# Patient Record
Sex: FEMALE | Race: BLACK OR AFRICAN AMERICAN | Employment: OTHER | URBAN - METROPOLITAN AREA
[De-identification: names, ages, dates, MRNs, and addresses within clinical notes are randomized per-mention and may not be internally consistent; named-entity substitution may affect disease eponyms.]

---

## 2017-05-26 ENCOUNTER — OP HISTORICAL/CONVERTED ENCOUNTER (OUTPATIENT)
Dept: OTHER | Age: 47
End: 2017-05-26

## 2017-10-12 ENCOUNTER — DOCUMENTATION ONLY (OUTPATIENT)
Dept: SURGERY | Age: 47
End: 2017-10-12

## 2017-10-13 ENCOUNTER — DOCUMENTATION ONLY (OUTPATIENT)
Dept: SURGERY | Age: 47
End: 2017-10-13

## 2017-10-13 ENCOUNTER — OFFICE VISIT (OUTPATIENT)
Dept: SURGERY | Age: 47
End: 2017-10-13

## 2017-10-13 VITALS
SYSTOLIC BLOOD PRESSURE: 156 MMHG | BODY MASS INDEX: 34.07 KG/M2 | HEIGHT: 69 IN | DIASTOLIC BLOOD PRESSURE: 100 MMHG | WEIGHT: 230 LBS | HEART RATE: 62 BPM

## 2017-10-13 DIAGNOSIS — N64.52 DISCHARGE FROM LEFT NIPPLE: Primary | ICD-10-CM

## 2017-10-13 NOTE — LETTER
10/13/2017 2:28 PM 
 
Patient:  Breann Oscar YOB: 1970 Date of Visit: 10/13/2017 Dear Dr. Renae Stapleton: 
 
Thank you for referring Ms. Breann Oscar to me for evaluation/treatment. Below are the relevant portions of my assessment and plan of care. HISTORY OF PRESENT ILLNESS Breann Oscar is a 42 y.o. female. HPI 
NEW patient consult referred by Dr. Renae Stapleton for LEFT nipple discharge. About a year ago, her LEFT breast was itchy. She squeezed her nipple and a clear brown discharge came out. The discharge comes out only when she squeezes her nipple. The discharge can sometimes be white. Has lumpy breasts. No nipple retraction. Denies pain. Breast reduction 2001. Reports normal menstrual periods. 
 
  
Denies FH of breast or ovarian cancer.  
  
Mammogram, [address] [facility] [address], 5/2017, BIRADS 1 History reviewed. No pertinent past medical history. Past Surgical History:  
Procedure Laterality Date  HX BREAST REDUCTION  2001  HX HEMORRHOIDECTOMY  2014 Social History Social History  Marital status:  Spouse name: N/A  
 Number of children: N/A  
 Years of education: N/A Occupational History  Not on file. Social History Main Topics  Smoking status: Never Smoker  Smokeless tobacco: Never Used  Alcohol use Yes Comment: 1 drink per week  Drug use: Not on file  Sexual activity: Not on file Other Topics Concern  Not on file Social History Narrative  No narrative on file No current outpatient prescriptions on file prior to visit. No current facility-administered medications on file prior to visit. Allergies Allergen Reactions  Sulfa (Sulfonamide Antibiotics) Other (comments) ROS Constitutional: Negative HENT: Negative. Eyes: Negative. Respiratory: Negative. Cardiovascular: Negative. Gastrointestinal: Negative. Genitourinary: Negative. Musculoskeletal: Negative. Skin: Negative. Neurological: Negative. Endo/Heme/Allergies: Negative. Psychiatric/Behavioral: Positive for insomnia Physical Exam  
Cardiovascular: Normal rate and normal heart sounds. Pulmonary/Chest: Breath sounds normal. Right breast exhibits no inverted nipple, no mass, no nipple discharge, no skin change and no tenderness. Left breast exhibits no inverted nipple, no mass, no nipple discharge, no skin change and no tenderness. Breasts are symmetrical.  
Lymphadenopathy:  
     Right cervical: No superficial cervical, no deep cervical and no posterior cervical adenopathy present. Left cervical: No superficial cervical, no deep cervical and no posterior cervical adenopathy present. Right axillary: No pectoral and no lateral adenopathy present. Left axillary: No pectoral and no lateral adenopathy present. ASSESSMENT and PLAN 
  ICD-10-CM ICD-9-CM 1. Discharge from left nipple N64.52 611.79 Patient is presenting due to LEFT clear nipple discharge and pain. Was able to express clear discharge during physical exam today. Recommended ductal excision. Discussed procedure, risks, and benefits of ductal excision. Also discussed recovery period of 1 week with some increased sensitivity during that week. This plan was reviewed with the patient and patient agrees. All questions were answered. Will schedule outpatient ductal excision. Written by Allison Dill, as dictated by Dr. Ariane Powers MD.  
 
 
 
If you have questions, please do not hesitate to call me. I look forward to following Ms. Ning Delaney along with you.  
 
 
 
Sincerely, 
 
 
Idalia Cosby MD

## 2017-10-13 NOTE — PATIENT INSTRUCTIONS
Nipple Discharge: Care Instructions  Your Care Instructions  Fluid leaking from one or both nipples when you are not breastfeeding is called nipple discharge. Clear, cloudy, or white discharge that appears only when you press on your nipple is usually normal. The more the nipple is pressed or stimulated, the more fluid appears. Yellow, green, or brown discharge is not normal and may be a symptom of an infection or other problem. Spontaneous discharge appears without pressing or stimulating the nipple. This is not normal unless you are pregnant or breastfeeding. It may be a side effect of a medicine, or it may be caused by other health problems. The treatment of spontaneous nipple discharge depends on what is causing it. You may need additional tests to find out what is causing the nipple discharge. Follow-up care is a key part of your treatment and safety. Be sure to make and go to all appointments, and call your doctor if you are having problems. Its also a good idea to know your test results and keep a list of the medicines you take. How can you care for yourself at home? · If your doctor gave you medicine, take it exactly as prescribed. Call your doctor if you think you are having a problem with your medicine. · Wear a supportive bra, such as a sports bra or jog bra. · Avoid stimulating your breast until you have your follow-up appointment. When should you call for help? Call your doctor now or seek immediate medical care if:  · You have symptoms of a breast infection, such as:  ¨ Increased pain, swelling, redness, or warmth around a breast.  ¨ Red streaks extending from the breast.  ¨ Pus draining from a breast.  ¨ A fever. Watch closely for changes in your health, and be sure to contact your doctor if:  · You notice any changes in your breast or discharge. · You do not get better as expected. Where can you learn more? Go to http://katiana-stella.info/.   Enter K471 in the search box to learn more about \"Nipple Discharge: Care Instructions. \"  Current as of: March 20, 2017  Content Version: 11.3  © 5544-6840 [employer]. Care instructions adapted under license by [employer] (which disclaims liability or warranty for this information). If you have questions about a medical condition or this instruction, always ask your healthcare professional. [place] [address] any warranty or liability for your use of this information.

## 2017-10-13 NOTE — MR AVS SNAPSHOT
Visit Information Date & Time Provider Department Dept. Phone Encounter #  
 10/13/2017  7:05 AM John Mcgregor MD [address] [facility] at [address] [place] [address] [id number] Upcoming Health Maintenance Date Due DTaP/Tdap/Td series (1 - Tdap) 12/1/1993 PAP AKA CERVICAL CYTOLOGY 12/1/1993 INFLUENZA AGE 9 TO ADULT 8/1/2017 Allergies as of 10/13/2017  Review Complete On: 10/13/2017 By: Vern Hatch RN Severity Noted Reaction Type Reactions Sulfa (Sulfonamide Antibiotics) High 10/13/2017    Swelling, Angioedema Current Immunizations  Never Reviewed No immunizations on file. Not reviewed this visit You Were Diagnosed With   
  
 Codes Comments Discharge from left nipple    -  Primary ICD-10-CM: N64.52 
ICD-9-CM: 611.79 Vitals BP Pulse Height(growth percentile) Weight(growth percentile) LMP BMI  
 (!) 156/100 62 5' 9\" (1.753 m) 230 lb (104.3 kg) 10/01/2017 33.97 kg/m2 OB Status Smoking Status Having regular periods Never Smoker Vitals History BMI and BSA Data Body Mass Index Body Surface Area  
 33.97 kg/m 2 2.25 m 2 Your Updated Medication List  
  
Notice  As of 10/13/2017 10:23 AM  
 You have not been prescribed any medications. Patient Instructions Nipple Discharge: Care Instructions Your Care Instructions Fluid leaking from one or both nipples when you are not breastfeeding is called nipple discharge. Clear, cloudy, or white discharge that appears only when you press on your nipple is usually normal. The more the nipple is pressed or stimulated, the more fluid appears. Yellow, green, or brown discharge is not normal and may be a symptom of an infection or other problem. Spontaneous discharge appears without pressing or stimulating the nipple. This is not normal unless you are pregnant or breastfeeding.  It may be a side effect of a medicine, or it may be caused by other health problems. The treatment of spontaneous nipple discharge depends on what is causing it. You may need additional tests to find out what is causing the nipple discharge. Follow-up care is a key part of your treatment and safety. Be sure to make and go to all appointments, and call your doctor if you are having problems. Its also a good idea to know your test results and keep a list of the medicines you take. How can you care for yourself at home? · If your doctor gave you medicine, take it exactly as prescribed. Call your doctor if you think you are having a problem with your medicine. · Wear a supportive bra, such as a sports bra or jog bra. · Avoid stimulating your breast until you have your follow-up appointment. When should you call for help? Call your doctor now or seek immediate medical care if: 
· You have symptoms of a breast infection, such as: 
¨ Increased pain, swelling, redness, or warmth around a breast. 
¨ Red streaks extending from the breast. 
¨ Pus draining from a breast. 
¨ A fever. Watch closely for changes in your health, and be sure to contact your doctor if: 
· You notice any changes in your breast or discharge. · You do not get better as expected. Where can you learn more? Go to http://katiana-stella.info/. Enter C834 in the search box to learn more about \"Nipple Discharge: Care Instructions. \" Current as of: March 20, 2017 Content Version: 11.3 © 5336-7672 [employer]. Care instructions adapted under license by [employer] (which disclaims liability or warranty for this information). If you have questions about a medical condition or this instruction, always ask your healthcare professional. [place] [address] any warranty or liability for your use of this information. Introducing [facility]! [facility] [facility] introduces Reonomy patient portal. Now you can access parts of your medical record, email your doctor's office, and request medication refills online. 1. In your internet browser, go to [email or web address] 2. Click on the First Time User? Click Here link in the Sign In box. You will see the New Member Sign Up page. 3. Enter your Reonomy Access Code exactly as it appears below. You will not need to use this code after youve completed the sign-up process. If you do not sign up before the expiration date, you must request a new code. · Reonomy Access Code: [email or web address] Expires: 1/11/2018  9:13 AM 
 
4. Enter the last four digits of your Social Security Number (xxxx) and Date of Birth (mm/dd/yyyy) as indicated and click Submit. You will be taken to the next sign-up page. 5. Create a Reonomy ID. This will be your Reonomy login ID and cannot be changed, so think of one that is secure and easy to remember. 6. Create a Reonomy password. You can change your password at any time. 7. Enter your Password Reset Question and Answer. This can be used at a later time if you forget your password. 8. Enter your e-mail address. You will receive e-mail notification when new information is available in [address] 9. Click Sign Up. You can now view and download portions of your medical record. 10. Click the Download Summary menu link to download a portable copy of your medical information. If you have questions, please visit the Frequently Asked Questions section of the Reonomy website. Remember, Reonomy is NOT to be used for urgent needs. For medical emergencies, dial 911. Now available from your iPhone and Android! Please provide this summary of care documentation to your next provider. Your primary care clinician is listed as Nahun Alexander. If you have any questions after today's visit, please call [phone].

## 2017-10-13 NOTE — PROGRESS NOTES
HISTORY OF PRESENT ILLNESS  Pema Hudson is a 42 y.o. female. HPI  NEW patient consult referred by Dr. Ovidio Olivera for LEFT nipple discharge. About a year ago, her LEFT breast was itchy. She squeezed her nipple and a clear brown discharge came out. The discharge comes out only when she squeezes her nipple. The discharge can sometimes be white. Has lumpy breasts. No nipple retraction. Denies pain. Breast reduction 2001. Reports normal menstrual periods.       Denies FH of breast or ovarian cancer.      Mammogram, [address] [facility] [address], 5/2017, BIRADS 1     History reviewed. No pertinent past medical history. Past Surgical History:   Procedure Laterality Date    HX BREAST REDUCTION  2001    HX HEMORRHOIDECTOMY  2014       Social History     Social History    Marital status:      Spouse name: N/A    Number of children: N/A    Years of education: N/A     Occupational History    Not on file. Social History Main Topics    Smoking status: Never Smoker    Smokeless tobacco: Never Used    Alcohol use Yes      Comment: 1 drink per week    Drug use: Not on file    Sexual activity: Not on file     Other Topics Concern    Not on file     Social History Narrative    No narrative on file       No current outpatient prescriptions on file prior to visit. No current facility-administered medications on file prior to visit. Allergies   Allergen Reactions    Sulfa (Sulfonamide Antibiotics) Other (comments)           ROS  Constitutional: Negative    HENT: Negative. Eyes: Negative. Respiratory: Negative. Cardiovascular: Negative. Gastrointestinal: Negative. Genitourinary: Negative. Musculoskeletal: Negative. Skin: Negative. Neurological: Negative. Endo/Heme/Allergies: Negative. Psychiatric/Behavioral: Positive for insomnia    Physical Exam   Cardiovascular: Normal rate and normal heart sounds.     Pulmonary/Chest: Breath sounds normal. Right breast exhibits no inverted nipple, no mass, no nipple discharge, no skin change and no tenderness. Left breast exhibits no inverted nipple, no mass, no nipple discharge, no skin change and no tenderness. Breasts are symmetrical.   Lymphadenopathy:        Right cervical: No superficial cervical, no deep cervical and no posterior cervical adenopathy present. Left cervical: No superficial cervical, no deep cervical and no posterior cervical adenopathy present. Right axillary: No pectoral and no lateral adenopathy present. Left axillary: No pectoral and no lateral adenopathy present. ASSESSMENT and PLAN    ICD-10-CM ICD-9-CM    1. Discharge from left nipple N64.52 611.79         Patient is presenting due to LEFT clear nipple discharge and pain. Was able to express clear discharge during physical exam today. Recommended ductal excision. Discussed procedure, risks, and benefits of ductal excision. Also discussed recovery period of 1 week with some increased sensitivity during that week. This plan was reviewed with the patient and patient agrees. All questions were answered. Will schedule outpatient ductal excision.     Written by Lanette Healy, as dictated by Dr. Yanni Gibson MD.

## 2017-10-13 NOTE — PROGRESS NOTES
Type of Film: [x] CD [] FILMS  Type of Test: [] MRI [x] MAMMO  From: [facility]  Given to: script drawer at [place] [address]  To be Downloaded into PACS:  NO

## 2017-10-13 NOTE — COMMUNICATION BODY
HISTORY OF PRESENT ILLNESS  Moiz Williamson is a 42 y.o. female. HPI  NEW patient consult referred by Dr. Colt Perez for LEFT nipple discharge. About a year ago, her LEFT breast was itchy. She squeezed her nipple and a clear brown discharge came out. The discharge comes out only when she squeezes her nipple. The discharge can sometimes be white. Has lumpy breasts. No nipple retraction. Denies pain. Breast reduction 2001. Reports normal menstrual periods.       Denies FH of breast or ovarian cancer.      Mammogram, [address] [facility] [address], 5/2017, BIRADS 1     History reviewed. No pertinent past medical history. Past Surgical History:   Procedure Laterality Date    HX BREAST REDUCTION  2001    HX HEMORRHOIDECTOMY  2014       Social History     Social History    Marital status:      Spouse name: N/A    Number of children: N/A    Years of education: N/A     Occupational History    Not on file. Social History Main Topics    Smoking status: Never Smoker    Smokeless tobacco: Never Used    Alcohol use Yes      Comment: 1 drink per week    Drug use: Not on file    Sexual activity: Not on file     Other Topics Concern    Not on file     Social History Narrative    No narrative on file       No current outpatient prescriptions on file prior to visit. No current facility-administered medications on file prior to visit. Allergies   Allergen Reactions    Sulfa (Sulfonamide Antibiotics) Other (comments)           ROS  Constitutional: Negative    HENT: Negative. Eyes: Negative. Respiratory: Negative. Cardiovascular: Negative. Gastrointestinal: Negative. Genitourinary: Negative. Musculoskeletal: Negative. Skin: Negative. Neurological: Negative. Endo/Heme/Allergies: Negative. Psychiatric/Behavioral: Positive for insomnia    Physical Exam   Cardiovascular: Normal rate and normal heart sounds.     Pulmonary/Chest: Breath sounds normal. Right breast exhibits no inverted nipple, no mass, no nipple discharge, no skin change and no tenderness. Left breast exhibits no inverted nipple, no mass, no nipple discharge, no skin change and no tenderness. Breasts are symmetrical.   Lymphadenopathy:        Right cervical: No superficial cervical, no deep cervical and no posterior cervical adenopathy present. Left cervical: No superficial cervical, no deep cervical and no posterior cervical adenopathy present. Right axillary: No pectoral and no lateral adenopathy present. Left axillary: No pectoral and no lateral adenopathy present. ASSESSMENT and PLAN    ICD-10-CM ICD-9-CM    1. Discharge from left nipple N64.52 611.79         Patient is presenting due to LEFT clear nipple discharge and pain. Was able to express clear discharge during physical exam today. Recommended ductal excision. Discussed procedure, risks, and benefits of ductal excision. Also discussed recovery period of 1 week with some increased sensitivity during that week. This plan was reviewed with the patient and patient agrees. All questions were answered. Will schedule outpatient ductal excision.     Written by Ever Soares, as dictated by Dr. Matilda Merrill MD.

## 2017-10-13 NOTE — PROGRESS NOTES
HISTORY OF PRESENT ILLNESS  Reina Cam is a 42 y.o. female. HPI  NEW patient consult referred by Dr. Pablo Lewis for LEFT nipple discharge. About a year ago, her LEFT breast was itchy. She squeezed her nipple and a clear brown discharge came out. The discharge comes out only when she squeezes her nipple. The discharge can sometimes be white. Has lumpy breasts. No nipple retraction. Denies pain. Denies FH of breast or ovarian cancer. Mammogram, [address] [facility] [address], 5/2017, BIRADS 1    Review of Systems   Constitutional: Negative. HENT: Negative. Eyes: Negative. Respiratory: Negative. Cardiovascular: Negative. Gastrointestinal: Negative. Genitourinary: Negative. Musculoskeletal: Negative. Skin: Negative. Neurological: Negative. Endo/Heme/Allergies: Negative. Psychiatric/Behavioral: The patient has insomnia.         Physical Exam    ASSESSMENT and PLAN  {ASSESSMENT/PLAN:62550}

## 2017-10-31 DIAGNOSIS — N64.52 NIPPLE DISCHARGE: Primary | ICD-10-CM

## 2017-11-16 ENCOUNTER — HOSPITAL ENCOUNTER (OUTPATIENT)
Age: 47
Setting detail: OUTPATIENT SURGERY
Discharge: HOME | End: 2017-11-16
Attending: SURGERY | Admitting: SURGERY
Payer: COMMERCIAL

## 2017-11-16 ENCOUNTER — ANESTHESIA (OUTPATIENT)
Dept: MEDSURG UNIT | Age: 47
End: 2017-11-16
Payer: COMMERCIAL

## 2017-11-16 ENCOUNTER — ANESTHESIA EVENT (OUTPATIENT)
Dept: MEDSURG UNIT | Age: 47
End: 2017-11-16
Payer: COMMERCIAL

## 2017-11-16 VITALS
HEIGHT: 69 IN | WEIGHT: 230 LBS | TEMPERATURE: 97.5 F | RESPIRATION RATE: 14 BRPM | SYSTOLIC BLOOD PRESSURE: 130 MMHG | BODY MASS INDEX: 34.07 KG/M2 | HEART RATE: 68 BPM | OXYGEN SATURATION: 95 % | DIASTOLIC BLOOD PRESSURE: 82 MMHG

## 2017-11-16 DIAGNOSIS — N64.52 NIPPLE DISCHARGE: ICD-10-CM

## 2017-11-16 LAB — HCG UR QL: NEGATIVE

## 2017-11-16 PROCEDURE — 77030032490 HC SLV COMPR SCD KNE COVD -B: Performed by: SURGERY

## 2017-11-16 PROCEDURE — 77030031139 HC SUT VCRL2 J&J -A: Performed by: SURGERY

## 2017-11-16 PROCEDURE — 74011250636 HC RX REV CODE- 250/636

## 2017-11-16 PROCEDURE — 77030019908 HC STETH ESOPH SIMS -A: Performed by: ANESTHESIOLOGY

## 2017-11-16 PROCEDURE — 77030010507 HC ADH SKN DERMBND J&J -B: Performed by: SURGERY

## 2017-11-16 PROCEDURE — 74011250636 HC RX REV CODE- 250/636: Performed by: ANESTHESIOLOGY

## 2017-11-16 PROCEDURE — 76030000001 HC AMB SURG OR TIME 1 TO 1.5: Performed by: SURGERY

## 2017-11-16 PROCEDURE — 88307 TISSUE EXAM BY PATHOLOGIST: CPT | Performed by: SURGERY

## 2017-11-16 PROCEDURE — 77030011640 HC PAD GRND REM COVD -A: Performed by: SURGERY

## 2017-11-16 PROCEDURE — 74011000250 HC RX REV CODE- 250: Performed by: SURGERY

## 2017-11-16 PROCEDURE — 77030018836 HC SOL IRR NACL ICUM -A: Performed by: SURGERY

## 2017-11-16 PROCEDURE — 81025 URINE PREGNANCY TEST: CPT

## 2017-11-16 PROCEDURE — 74011000250 HC RX REV CODE- 250

## 2017-11-16 PROCEDURE — 77030010509 HC AIRWY LMA MSK TELE -A: Performed by: ANESTHESIOLOGY

## 2017-11-16 PROCEDURE — 77030002996 HC SUT SLK J&J -A: Performed by: SURGERY

## 2017-11-16 PROCEDURE — 77030002933 HC SUT MCRYL J&J -A: Performed by: SURGERY

## 2017-11-16 PROCEDURE — 77030020782 HC GWN BAIR PAWS FLX 3M -B

## 2017-11-16 PROCEDURE — 76210000050 HC AMBSU PH II REC 0.5 TO 1 HR: Performed by: SURGERY

## 2017-11-16 PROCEDURE — 76060000062 HC AMB SURG ANES 1 TO 1.5 HR: Performed by: SURGERY

## 2017-11-16 PROCEDURE — 76210000038 HC AMBSU PH I REC 2.5 TO 3 HR: Performed by: SURGERY

## 2017-11-16 RX ORDER — MIDAZOLAM HYDROCHLORIDE 1 MG/ML
0.5 INJECTION, SOLUTION INTRAMUSCULAR; INTRAVENOUS
Status: DISCONTINUED | OUTPATIENT
Start: 2017-11-16 | End: 2017-11-16 | Stop reason: HOSPADM

## 2017-11-16 RX ORDER — LIDOCAINE HYDROCHLORIDE 10 MG/ML
0.1 INJECTION, SOLUTION EPIDURAL; INFILTRATION; INTRACAUDAL; PERINEURAL AS NEEDED
Status: DISCONTINUED | OUTPATIENT
Start: 2017-11-16 | End: 2017-11-16 | Stop reason: HOSPADM

## 2017-11-16 RX ORDER — ONDANSETRON 2 MG/ML
4 INJECTION INTRAMUSCULAR; INTRAVENOUS AS NEEDED
Status: DISCONTINUED | OUTPATIENT
Start: 2017-11-16 | End: 2017-11-16 | Stop reason: HOSPADM

## 2017-11-16 RX ORDER — SODIUM CHLORIDE, SODIUM LACTATE, POTASSIUM CHLORIDE, CALCIUM CHLORIDE 600; 310; 30; 20 MG/100ML; MG/100ML; MG/100ML; MG/100ML
INJECTION, SOLUTION INTRAVENOUS
Status: DISCONTINUED | OUTPATIENT
Start: 2017-11-16 | End: 2017-11-16

## 2017-11-16 RX ORDER — LIDOCAINE HYDROCHLORIDE 20 MG/ML
INJECTION, SOLUTION EPIDURAL; INFILTRATION; INTRACAUDAL; PERINEURAL AS NEEDED
Status: DISCONTINUED | OUTPATIENT
Start: 2017-11-16 | End: 2017-11-16 | Stop reason: HOSPADM

## 2017-11-16 RX ORDER — ONDANSETRON 2 MG/ML
INJECTION INTRAMUSCULAR; INTRAVENOUS AS NEEDED
Status: DISCONTINUED | OUTPATIENT
Start: 2017-11-16 | End: 2017-11-16 | Stop reason: HOSPADM

## 2017-11-16 RX ORDER — SODIUM CHLORIDE 0.9 % (FLUSH) 0.9 %
5-10 SYRINGE (ML) INJECTION EVERY 8 HOURS
Status: DISCONTINUED | OUTPATIENT
Start: 2017-11-16 | End: 2017-11-16 | Stop reason: HOSPADM

## 2017-11-16 RX ORDER — SODIUM CHLORIDE 9 MG/ML
1000 INJECTION, SOLUTION INTRAVENOUS CONTINUOUS
Status: DISCONTINUED | OUTPATIENT
Start: 2017-11-16 | End: 2017-11-16 | Stop reason: HOSPADM

## 2017-11-16 RX ORDER — MIDAZOLAM HYDROCHLORIDE 1 MG/ML
INJECTION, SOLUTION INTRAMUSCULAR; INTRAVENOUS AS NEEDED
Status: DISCONTINUED | OUTPATIENT
Start: 2017-11-16 | End: 2017-11-16 | Stop reason: HOSPADM

## 2017-11-16 RX ORDER — FENTANYL CITRATE 50 UG/ML
INJECTION, SOLUTION INTRAMUSCULAR; INTRAVENOUS AS NEEDED
Status: DISCONTINUED | OUTPATIENT
Start: 2017-11-16 | End: 2017-11-16 | Stop reason: HOSPADM

## 2017-11-16 RX ORDER — MIDAZOLAM HYDROCHLORIDE 1 MG/ML
1 INJECTION, SOLUTION INTRAMUSCULAR; INTRAVENOUS AS NEEDED
Status: DISCONTINUED | OUTPATIENT
Start: 2017-11-16 | End: 2017-11-16 | Stop reason: HOSPADM

## 2017-11-16 RX ORDER — SODIUM CHLORIDE, SODIUM LACTATE, POTASSIUM CHLORIDE, CALCIUM CHLORIDE 600; 310; 30; 20 MG/100ML; MG/100ML; MG/100ML; MG/100ML
125 INJECTION, SOLUTION INTRAVENOUS CONTINUOUS
Status: DISCONTINUED | OUTPATIENT
Start: 2017-11-16 | End: 2017-11-16 | Stop reason: HOSPADM

## 2017-11-16 RX ORDER — FENTANYL CITRATE 50 UG/ML
50 INJECTION, SOLUTION INTRAMUSCULAR; INTRAVENOUS AS NEEDED
Status: DISCONTINUED | OUTPATIENT
Start: 2017-11-16 | End: 2017-11-16 | Stop reason: HOSPADM

## 2017-11-16 RX ORDER — DEXAMETHASONE SODIUM PHOSPHATE 4 MG/ML
INJECTION, SOLUTION INTRA-ARTICULAR; INTRALESIONAL; INTRAMUSCULAR; INTRAVENOUS; SOFT TISSUE AS NEEDED
Status: DISCONTINUED | OUTPATIENT
Start: 2017-11-16 | End: 2017-11-16 | Stop reason: HOSPADM

## 2017-11-16 RX ORDER — PHENYLEPHRINE HCL IN 0.9% NACL 0.4MG/10ML
SYRINGE (ML) INTRAVENOUS AS NEEDED
Status: DISCONTINUED | OUTPATIENT
Start: 2017-11-16 | End: 2017-11-16 | Stop reason: HOSPADM

## 2017-11-16 RX ORDER — HYDROCODONE BITARTRATE AND ACETAMINOPHEN 7.5; 325 MG/1; MG/1
1 TABLET ORAL
Qty: 35 TAB | Refills: 0 | Status: SHIPPED | OUTPATIENT
Start: 2017-11-16 | End: 2017-12-18

## 2017-11-16 RX ORDER — MORPHINE SULFATE 10 MG/ML
2 INJECTION, SOLUTION INTRAMUSCULAR; INTRAVENOUS
Status: DISCONTINUED | OUTPATIENT
Start: 2017-11-16 | End: 2017-11-16 | Stop reason: HOSPADM

## 2017-11-16 RX ORDER — FENTANYL CITRATE 50 UG/ML
25 INJECTION, SOLUTION INTRAMUSCULAR; INTRAVENOUS
Status: DISCONTINUED | OUTPATIENT
Start: 2017-11-16 | End: 2017-11-16 | Stop reason: HOSPADM

## 2017-11-16 RX ORDER — SODIUM CHLORIDE 0.9 % (FLUSH) 0.9 %
5-10 SYRINGE (ML) INJECTION AS NEEDED
Status: DISCONTINUED | OUTPATIENT
Start: 2017-11-16 | End: 2017-11-16 | Stop reason: HOSPADM

## 2017-11-16 RX ORDER — DIPHENHYDRAMINE HYDROCHLORIDE 50 MG/ML
12.5 INJECTION, SOLUTION INTRAMUSCULAR; INTRAVENOUS AS NEEDED
Status: DISCONTINUED | OUTPATIENT
Start: 2017-11-16 | End: 2017-11-16 | Stop reason: HOSPADM

## 2017-11-16 RX ORDER — PROPOFOL 10 MG/ML
INJECTION, EMULSION INTRAVENOUS AS NEEDED
Status: DISCONTINUED | OUTPATIENT
Start: 2017-11-16 | End: 2017-11-16 | Stop reason: HOSPADM

## 2017-11-16 RX ORDER — GLYCOPYRROLATE 0.2 MG/ML
INJECTION INTRAMUSCULAR; INTRAVENOUS AS NEEDED
Status: DISCONTINUED | OUTPATIENT
Start: 2017-11-16 | End: 2017-11-16 | Stop reason: HOSPADM

## 2017-11-16 RX ORDER — SODIUM CHLORIDE 9 MG/ML
50 INJECTION, SOLUTION INTRAVENOUS CONTINUOUS
Status: DISCONTINUED | OUTPATIENT
Start: 2017-11-16 | End: 2017-11-16 | Stop reason: HOSPADM

## 2017-11-16 RX ORDER — OXYCODONE AND ACETAMINOPHEN 5; 325 MG/1; MG/1
1 TABLET ORAL AS NEEDED
Status: DISCONTINUED | OUTPATIENT
Start: 2017-11-16 | End: 2017-11-16 | Stop reason: HOSPADM

## 2017-11-16 RX ORDER — LIDOCAINE HYDROCHLORIDE 10 MG/ML
30 INJECTION INFILTRATION; PERINEURAL ONCE
Status: DISCONTINUED | OUTPATIENT
Start: 2017-11-16 | End: 2017-11-16 | Stop reason: HOSPADM

## 2017-11-16 RX ORDER — BUPIVACAINE HYDROCHLORIDE 5 MG/ML
20 INJECTION, SOLUTION EPIDURAL; INTRACAUDAL ONCE
Status: DISCONTINUED | OUTPATIENT
Start: 2017-11-16 | End: 2017-11-16 | Stop reason: HOSPADM

## 2017-11-16 RX ORDER — HYDROMORPHONE HYDROCHLORIDE 1 MG/ML
0.2 INJECTION, SOLUTION INTRAMUSCULAR; INTRAVENOUS; SUBCUTANEOUS
Status: DISCONTINUED | OUTPATIENT
Start: 2017-11-16 | End: 2017-11-16 | Stop reason: HOSPADM

## 2017-11-16 RX ADMIN — Medication 80 MCG: at 12:35

## 2017-11-16 RX ADMIN — Medication 40 MCG: at 12:23

## 2017-11-16 RX ADMIN — GLYCOPYRROLATE 0.2 MG: 0.2 INJECTION INTRAMUSCULAR; INTRAVENOUS at 12:27

## 2017-11-16 RX ADMIN — DEXAMETHASONE SODIUM PHOSPHATE 8 MG: 4 INJECTION, SOLUTION INTRA-ARTICULAR; INTRALESIONAL; INTRAMUSCULAR; INTRAVENOUS; SOFT TISSUE at 12:11

## 2017-11-16 RX ADMIN — SODIUM CHLORIDE, SODIUM LACTATE, POTASSIUM CHLORIDE, AND CALCIUM CHLORIDE 125 ML/HR: 600; 310; 30; 20 INJECTION, SOLUTION INTRAVENOUS at 11:01

## 2017-11-16 RX ADMIN — MIDAZOLAM HYDROCHLORIDE 2 MG: 1 INJECTION, SOLUTION INTRAMUSCULAR; INTRAVENOUS at 11:43

## 2017-11-16 RX ADMIN — FENTANYL CITRATE 50 MCG: 50 INJECTION, SOLUTION INTRAMUSCULAR; INTRAVENOUS at 11:57

## 2017-11-16 RX ADMIN — Medication 80 MCG: at 11:57

## 2017-11-16 RX ADMIN — LIDOCAINE HYDROCHLORIDE 100 MG: 20 INJECTION, SOLUTION EPIDURAL; INFILTRATION; INTRACAUDAL; PERINEURAL at 11:54

## 2017-11-16 RX ADMIN — PROPOFOL 200 MG: 10 INJECTION, EMULSION INTRAVENOUS at 11:54

## 2017-11-16 RX ADMIN — Medication 80 MCG: at 12:27

## 2017-11-16 RX ADMIN — ONDANSETRON 4 MG: 2 INJECTION INTRAMUSCULAR; INTRAVENOUS at 12:11

## 2017-11-16 NOTE — BRIEF OP NOTE
BRIEF OPERATIVE NOTE    Date of Procedure: 11/16/2017   Preoperative Diagnosis: LEFT NIPPLE DISCHARGE   Postoperative Diagnosis: LEFT NIPPLE DISCHARGE     Procedure(s):  LEFT BREAST DUCTAL EXCISION  Surgeon(s) and Role:     * Ally Greenwood MD - Primary         Assistant Staff:       Surgical Staff:  Circ-1: Batsheva Mendenhall RN  Circ-2: Princess Maycol RN  Registered Nurse Assistant: Mango Zambrano RN  Scrub Tech-1: Duey Score  Event Time In   Incision Start 1208   Incision Close 1244     Anesthesia: General   Estimated Blood Loss: minimal  Specimens:   ID Type Source Tests Collected by Time Destination   1 : LEFT BREAST DUCTAL EXCISION Fresh Breast  Ally Greenwood MD 76/97/3754 1207 Pathology      Findings: single involved duct   Complications: none  Implants: * No implants in log *

## 2017-11-16 NOTE — ANESTHESIA PREPROCEDURE EVALUATION
Anesthetic History   No history of anesthetic complications            Review of Systems / Medical History  Patient summary reviewed, nursing notes reviewed and pertinent labs reviewed    Pulmonary  Within defined limits                 Neuro/Psych   Within defined limits           Cardiovascular  Within defined limits  Hypertension                   GI/Hepatic/Renal  Within defined limits              Endo/Other  Within defined limits           Other Findings              Physical Exam    Airway  Mallampati: I  TM Distance: > 6 cm  Neck ROM: normal range of motion   Mouth opening: Normal     Cardiovascular  Regular rate and rhythm,  S1 and S2 normal,  no murmur, click, rub, or gallop             Dental  No notable dental hx       Pulmonary  Breath sounds clear to auscultation               Abdominal  GI exam deferred       Other Findings            Anesthetic Plan    ASA: 2  Anesthesia type: general          Induction: Intravenous  Anesthetic plan and risks discussed with: Patient

## 2017-11-16 NOTE — IP AVS SNAPSHOT
[address] 
[phone] Patient: Job Reason MRN: [id number] :1970 About your hospitalization You were admitted on:  2017 You last received care in the:  [facility] PACU You were discharged on:  2017 Why you were hospitalized Your primary diagnosis was:  Not on File Things You Need To Do (next 8 weeks) Follow up with Caitlyn Muñoz MD  
  
Phone:  [phone] Where:  [address] [facility], [address] [facility] [address] Friday Dec 15, 2017 POST OP with Rolo Pedro MD at 17:70 AM  
Where: [address] at [address]) Discharge Orders None A check marlo indicates which time of day the medication should be taken. My Medications TAKE these medications as instructed Instructions Each Dose to Equal  
 Morning Noon Evening Bedtime HYDROcodone-acetaminophen 7.5-325 mg per tablet Commonly known as:  Burgess Blanco Your last dose was: Your next dose is: Take 1 Tab by mouth every four (4) hours as needed for Pain. Max Daily Amount: 6 Tabs. 1 Tab Where to Get Your Medications Information on where to get these meds will be given to you by the nurse or doctor. ! Ask your nurse or doctor about these medications HYDROcodone-acetaminophen 7.5-325 mg per tablet Discharge Instructions Discharge Instructions from Dr. Seda Solorio · I will call you with the pathology results, typically within 1 week from today. · You may shower, but no hot tubs, swimming pools, or baths until your incision is healed. · No heavy lifting with the affected extremity (nothing greater than 5 pounds), and limit its use for the next 4-5 days. · You may use an ice pack for comfort for the next couple of days, but do not place ice directly on the skin. Rather, use a towel or clothing to serve as a barrier between skin and ice to prevent injury. · If I placed a drain, follow the drain instructions provided, especially as you keep a record of the drain output. · Follow medication instructions carefully. · Watch for signs of infection as listed below. · Redness · Swelling · Drainage from the incision or from your nipple that appears infected · Fever over 101 degrees for consecutive readings, or over 99.5 if you are currently undergoing chemotherapy. · Call our office (number is below) for a follow-up appointment. · If you have any problems, our phone number is [phone]. DISCHARGE SUMMARY from Nurse PATIENT INSTRUCTIONS: 
 
 
F-face looks uneven A-arms unable to move or move unevenly S-speech slurred or non-existent T-time-call 911 as soon as signs and symptoms begin-DO NOT go Back to bed or wait to see if you get better-TIME IS BRAIN. Warning Signs of HEART ATTACK Call 911 if you have these symptoms: 
? Chest discomfort. Most heart attacks involve discomfort in the center of the chest that lasts more than a few minutes, or that goes away and comes back. It can feel like uncomfortable pressure, squeezing, fullness, or pain. ? Discomfort in other areas of the upper body. Symptoms can include pain or discomfort in one or both arms, the back, neck, jaw, or stomach. ? Shortness of breath with or without chest discomfort. ? Other signs may include breaking out in a cold sweat, nausea, or lightheadedness. Don't wait more than five minutes to call [address]! Fast action can save your life. Calling 911 is almost always the fastest way to get lifesaving treatment. Emergency Medical Services staff can begin treatment when they arrive  up to an hour sooner than if someone gets to the hospital by car. The discharge information has been reviewed with the patient and spouse. The patient and spouse verbalized understanding. Discharge medications reviewed with the patient and spouse and appropriate educational materials and side effects teaching were provided. ___________________________________________________________________________________________________________________________________ Introducing [facility]! Chad Clayton introduces [employer] patient portal. Now you can access parts of your medical record, email your doctor's office, and request medication refills online. 1. In your internet browser, go to [email or web address] 2. Click on the First Time User? Click Here link in the Sign In box. You will see the New Member Sign Up page. 3. Enter your [employer] Access Code exactly as it appears below. You will not need to use this code after youve completed the sign-up process. If you do not sign up before the expiration date, you must request a new code. · [employer] Access Code: [email or web address] Expires: 1/11/2018  8:13 AM 
 
4. Enter the last four digits of your Social Security Number (xxxx) and Date of Birth (mm/dd/yyyy) as indicated and click Submit. You will be taken to the next sign-up page. 5. Create a eventblimpt ID. This will be your [employer] login ID and cannot be changed, so think of one that is secure and easy to remember. 6. Create a [employer] password. You can change your password at any time. 7. Enter your Password Reset Question and Answer. This can be used at a later time if you forget your password. 8. Enter your e-mail address. You will receive e-mail notification when new information is available in [address] 9. Click Sign Up. You can now view and download portions of your medical record. 10. Click the Download Summary menu link to download a portable copy of your medical information. If you have questions, please visit the Frequently Asked Questions section of the [employer] website. Remember, [employer] is NOT to be used for urgent needs. For medical emergencies, dial 911. Now available from your iPhone and Android! Providers Seen During Your Hospitalization Provider Specialty Primary office phone Sonali Nails MD Breast Surgery [phone] Your Primary Care Physician (PCP) Primary Care Physician Office Phone Office Fax [address] [facility] [place], [place] [address] [phone] You are allergic to the following Allergen Reactions Sulfa (Sulfonamide Antibiotics) Swelling Angioedema Recent Documentation Height Weight BMI OB Status Smoking Status 1.753 m 104.3 kg 33.97 kg/m2 Having regular periods Never Smoker Emergency Contacts Name Discharge Info Relation Home Work Mobile Beata Lira DISCHARGE CAREGIVER [3] Spouse [3] [phone] Patient Belongings The following personal items are in your possession at time of discharge: 
  Dental Appliances: None  Visual Aid: Contacts (removed)             Clothing:  (clothes in bag) Please provide this summary of care documentation to your next provider. Signatures-by signing, you are acknowledging that this After Visit Summary has been reviewed with you and you have received a copy. Patient Signature:  ____________________________________________________________ Date:  ____________________________________________________________  
  
Michelle Nava Provider Signature:  ____________________________________________________________ Date:  ____________________________________________________________

## 2017-11-16 NOTE — DISCHARGE INSTRUCTIONS
Discharge Instructions from Dr. Antonio Orellana    · I will call you with the pathology results, typically within 1 week from today. · You may shower, but no hot tubs, swimming pools, or baths until your incision is healed. · No heavy lifting with the affected extremity (nothing greater than 5 pounds), and limit its use for the next 4-5 days. · You may use an ice pack for comfort for the next couple of days, but do not place ice directly on the skin. Rather, use a towel or clothing to serve as a barrier between skin and ice to prevent injury. · If I placed a drain, follow the drain instructions provided, especially as you keep a record of the drain output. · Follow medication instructions carefully. · Watch for signs of infection as listed below. · Redness  · Swelling  · Drainage from the incision or from your nipple that appears infected  · Fever over 101 degrees for consecutive readings, or over 99.5 if you are currently undergoing chemotherapy. · Call our office (number is below) for a follow-up appointment. · If you have any problems, our phone number is [phone]. DISCHARGE SUMMARY from Nurse    PATIENT INSTRUCTIONS:    After general anesthesia or intravenous sedation, for 24 hours or while taking prescription Narcotics:  · Limit your activities  · Do not drive and operate hazardous machinery  · Do not make important personal or business decisions  · Do  not drink alcoholic beverages  · If you have not urinated within 8 hours after discharge, please contact your surgeon on call.     Report the following to your surgeon:  · Excessive pain, swelling, redness or odor of or around the surgical area  · Temperature over 100.5  · Nausea and vomiting lasting longer than 4 hours or if unable to take medications  · Any signs of decreased circulation or nerve impairment to extremity: change in color, persistent  numbness, tingling, coldness or increase pain  · Any questions    What to do at Home:  Recommended activity: Activity as tolerated and no driving for today and No driving while on analgesics,     If you experience any of the following symptoms ; as noted above, please follow up with . *  Please give a list of your current medications to your Primary Care Provider. *  Please update this list whenever your medications are discontinued, doses are      changed, or new medications (including over-the-counter products) are added. *  Please carry medication information at all times in case of emergency situations. These are general instructions for a healthy lifestyle:    No smoking/ No tobacco products/ Avoid exposure to second hand smoke  Surgeon General's Warning:  Quitting smoking now greatly reduces serious risk to your health. Obesity, smoking, and sedentary lifestyle greatly increases your risk for illness    A healthy diet, regular physical exercise & weight monitoring are important for maintaining a healthy lifestyle    You may be retaining fluid if you have a history of heart failure or if you experience any of the following symptoms:  Weight gain of 3 pounds or more overnight or 5 pounds in a week, increased swelling in our hands or feet or shortness of breath while lying flat in bed. Please call your doctor as soon as you notice any of these symptoms; do not wait until your next office visit. Recognize signs and symptoms of STROKE:    F-face looks uneven    A-arms unable to move or move unevenly    S-speech slurred or non-existent    T-time-call 911 as soon as signs and symptoms begin-DO NOT go       Back to bed or wait to see if you get better-TIME IS BRAIN. Warning Signs of HEART ATTACK     Call 911 if you have these symptoms:   Chest discomfort. Most heart attacks involve discomfort in the center of the chest that lasts more than a few minutes, or that goes away and comes back.  It can feel like uncomfortable pressure, squeezing, fullness, or pain.  Discomfort in other areas of the upper body. Symptoms can include pain or discomfort in one or both arms, the back, neck, jaw, or stomach.  Shortness of breath with or without chest discomfort.  Other signs may include breaking out in a cold sweat, nausea, or lightheadedness. Don't wait more than five minutes to call 911 - MINUTES MATTER! Fast action can save your life. Calling 911 is almost always the fastest way to get lifesaving treatment. Emergency Medical Services staff can begin treatment when they arrive -- up to an hour sooner than if someone gets to the hospital by car. The discharge information has been reviewed with the patient and spouse. The patient and spouse verbalized understanding. Discharge medications reviewed with the patient and spouse and appropriate educational materials and side effects teaching were provided.   ___________________________________________________________________________________________________________________________________

## 2017-11-16 NOTE — ANESTHESIA POSTPROCEDURE EVALUATION
Post-Anesthesia Evaluation and Assessment    Patient: Reina Cam MRN: [id number]  SSN: [id number]    YOB: 1970  Age: 42 y.o. Sex: female       Cardiovascular Function/Vital Signs  Visit Vitals    /78    Pulse 60    Temp 36.4 °C (97.5 °F)    Resp 19    Ht 5' 9\" (1.753 m)    Wt 104.3 kg (230 lb)    SpO2 98%    BMI 33.97 kg/m2       Patient is status post general anesthesia for Procedure(s):  LEFT BREAST DUCTAL EXCISION. Nausea/Vomiting: None    Postoperative hydration reviewed and adequate. Pain:  Pain Scale 1: Numeric (0 - 10) (11/16/17 1252)  Pain Intensity 1: 0 (11/16/17 1252)   Managed    Neurological Status:   Neuro (WDL): Exceptions to WDL (11/16/17 1252)  Neuro  Neurologic State: Drowsy; Eyes open spontaneously (11/16/17 1252)   At baseline    Mental Status and Level of Consciousness: Arousable    Pulmonary Status:   O2 Device: Nasal cannula (11/16/17 1256)   Adequate oxygenation and airway patent    Complications related to anesthesia: None    Post-anesthesia assessment completed.  No concerns    Signed By: Eduard Henderson MD     November 16, 2017

## 2017-11-16 NOTE — ROUTINE PROCESS
Patient: Eliz Valencia MRN: [id number]  SSN: [id number]   YOB: 1970  Age: 42 y.o. Sex: female     Patient is status post Procedure(s):  LEFT BREAST DUCTAL EXCISION.     Surgeon(s) and Role:     * Elenita Owen MD - Primary    Local/Dose/Irrigation:  See [facility]                  Peripheral IV 11/16/17 Right Antecubital (Active)   Site Assessment Clean, dry, & intact 11/16/2017 11:06 AM   Phlebitis Assessment 0 11/16/2017 11:06 AM   Dressing Status Occlusive 11/16/2017 11:06 AM                           Dressing/Packing:  Wound Breast Left-DRESSING TYPE: Topical skin adhesive/glue (11/16/17 1100)  Splint/Cast:  ]    Other:

## 2017-11-16 NOTE — H&P
HISTORY OF PRESENT ILLNESS  Luciana Kelly is a 42 y.o. female. HPI  NEW patient consult referred by Dr. Prachi Sanchez for LEFT nipple discharge.  About a year ago, her LEFT breast was itchy.  She squeezed her nipple and a clear brown discharge came out.  The discharge comes out only when she squeezes her nipple.  The discharge can sometimes be white.  Has lumpy breasts.  No nipple retraction.  Denies pain. Breast reduction 2001. Reports normal menstrual periods.         Denies FH of breast or ovarian cancer.       Mammogram, [address] [facility] [address], 5/2017, BIRADS 1      History reviewed. No pertinent past medical history.           Past Surgical History:   Procedure Laterality Date    HX BREAST REDUCTION   2001    HX HEMORRHOIDECTOMY   2014         Social History            Social History    Marital status:        Spouse name: N/A    Number of children: N/A    Years of education: N/A          Occupational History    Not on file.              Social History Main Topics     Smoking status: Never Smoker     Smokeless tobacco: Never Used     Alcohol use Yes         Comment: 1 drink per week     Drug use: Not on file     Sexual activity: Not on file            Other Topics Concern    Not on file          Social History Narrative    No narrative on file         No current outpatient prescriptions on file prior to visit.      No current facility-administered medications on file prior to visit.               Allergies   Allergen Reactions    Sulfa (Sulfonamide Antibiotics) Other (comments)               ROS  Constitutional: Negative    HENT: Negative. Eyes: Negative. Respiratory: Negative. Cardiovascular: Negative. Gastrointestinal: Negative. Genitourinary: Negative. Musculoskeletal: Negative. Skin: Negative. Neurological: Negative. Endo/Heme/Allergies: Negative. Psychiatric/Behavioral: Positive for insomnia     Physical Exam   Cardiovascular: Normal rate and normal heart sounds.     Pulmonary/Chest: Breath sounds normal. Right breast exhibits no inverted nipple, no mass, no nipple discharge, no skin change and no tenderness. Left breast exhibits no inverted nipple, no mass, no nipple discharge, no skin change and no tenderness. Breasts are symmetrical.   Lymphadenopathy:        Right cervical: No superficial cervical, no deep cervical and no posterior cervical adenopathy present. Left cervical: No superficial cervical, no deep cervical and no posterior cervical adenopathy present. Right axillary: No pectoral and no lateral adenopathy present. Left axillary: No pectoral and no lateral adenopathy present.        ASSESSMENT and PLAN      ICD-10-CM ICD-9-CM     1. Discharge from left nipple N64.52 611.79           Patient is presenting due to LEFT clear nipple discharge and pain. Was able to express clear discharge during physical exam today. Recommended ductal excision. Discussed procedure, risks, and benefits of ductal excision. Also discussed recovery period of 1 week with some increased sensitivity during that week. This plan was reviewed with the patient and patient agrees. All questions were answered.     Will schedule outpatient ductal excision.

## 2017-11-17 NOTE — OP NOTES
[address] [facility] [address], [address]   OP NOTE       Name:  Karen Crandall   MR#:  [id number]   :  1970   Account #:  [de-identified]    Surgery Date:  2017   Date of Adm:  2017       PREOPERATIVE DIAGNOSIS:  Left clear nipple discharge. POSTOPERATIVE DIAGNOSIS:  Left clear nipple discharge. PROCEDURES PERFORMED:  Left ductal excision. ESTIMATED BLOOD LOSS: Minimal.    SPECIMENS REMOVED: Left breast ducts. ANESTHESIA:  General.    SURGEON: Leeann Hudson. Phan Christy MD    INDICATIONS: The patient is a 70-year-old female who has a   pathologic left nipple discharge, who presents for ductal excision. DESCRIPTION OF PROCEDURE: After satisfactory induction of   general LMA anesthesia, the patient was prepped and draped in sterile   fashion. The offending duct was identified and cannulated with a   lacrimal duct probe. Circumareolar incision was made in the underside   of nipple-areolar complex and deepened through subcutaneous tissue   with Bovie cautery, and the offending duct was identified. It was   disconnected from the back wall of the nipple and excised posteriorly   into the breast for over a distance. The specimen was removed, it   was oriented, and sent to Pathology. The wound was then closed in 2   layers with inner layers of 3-0 Vicryl and running subcutaneous 4-0   Monocryl on skin after ensuring hemostasis. The patient tolerated the procedure well without complications. She   was taken to the recovery room in stable condition.         MD Marely Correa / Wiliam Song   D:  2017   12:52   T:  2017   21:21   Job #:  [id number]

## 2017-11-20 ENCOUNTER — TELEPHONE (OUTPATIENT)
Dept: SURGERY | Age: 47
End: 2017-11-20

## 2017-12-18 ENCOUNTER — DOCUMENTATION ONLY (OUTPATIENT)
Dept: SURGERY | Age: 47
End: 2017-12-18

## 2017-12-18 ENCOUNTER — OFFICE VISIT (OUTPATIENT)
Dept: SURGERY | Age: 47
End: 2017-12-18

## 2017-12-18 VITALS
HEART RATE: 69 BPM | WEIGHT: 230 LBS | SYSTOLIC BLOOD PRESSURE: 141 MMHG | HEIGHT: 69 IN | DIASTOLIC BLOOD PRESSURE: 89 MMHG | BODY MASS INDEX: 34.07 KG/M2

## 2017-12-18 DIAGNOSIS — Z48.89 POSTOPERATIVE VISIT: Primary | ICD-10-CM

## 2017-12-18 NOTE — PROGRESS NOTES
Type of Film: [x] CD [] FILMS  Type of Test: [] MRI [x] MAMMO  From: [facility]  Given to: given back to patient at appointment  To be Downloaded into PACS:  NO.  She plans to continue imaging with [facility]

## 2017-12-18 NOTE — PROGRESS NOTES
HISTORY OF PRESENT ILLNESS  Oscar Kat is a 41 y.o. female. HPI  ESTABLISHED patient here today for post op follow up. S/p LEFT ductal excision on 17. Her LEFT nipple remains tender. Denies any other breast problems at this time.      Breast history-  - bilateral breast reduction  17- LEFT ductal excision    Past Medical History:   Diagnosis Date    Hypertension     bp elevated patient to begin bp med after prpocedure today    Nipple discharge     left       Past Surgical History:   Procedure Laterality Date    HX BREAST LUMPECTOMY Left 2017    LEFT BREAST DUCTAL EXCISION performed by Marjorie Bran MD at [address] HX BREAST REDUCTION      HX HEMORRHOIDECTOMY         Social History     Social History    Marital status:      Spouse name: N/A    Number of children: N/A    Years of education: N/A     Occupational History    Not on file. Social History Main Topics    Smoking status: Never Smoker    Smokeless tobacco: Never Used    Alcohol use Yes      Comment: 1 drink per week    Drug use: Not on file    Sexual activity: Not on file     Other Topics Concern    Not on file     Social History Narrative       No current outpatient prescriptions on file prior to visit. No current facility-administered medications on file prior to visit. Allergies   Allergen Reactions    Sulfa (Sulfonamide Antibiotics) Swelling and Angioedema       OB History      Para Term  AB Living    3 2   1 2    SAB TAB Ectopic Molar Multiple Live Births                 Obstetric Comments    Menarche: 17. LMP: 10/1/17. # of Children:  2. Age at Delivery of First Child:  27.   Hysterectomy/oophorectomy:  NO/NO. Breast Bx:  no.  Hx of Breast Feeding:  no. BCP:  yes. Hormone therapy:  no.           ROS  Constitutional: Negative    HENT: Negative. Eyes: Negative. Respiratory: Negative. Cardiovascular: Negative.    Gastrointestinal: Negative. Genitourinary: Negative. Musculoskeletal: Negative. Skin: Negative. Neurological: Negative. Endo/Heme/Allergies: Negative. Psychiatric/Behavioral: Negative. Physical Exam   Cardiovascular: Normal rate and normal heart sounds. Pulmonary/Chest: Breath sounds normal. Right breast exhibits no inverted nipple, no mass, no nipple discharge, no skin change and no tenderness. Left breast exhibits no inverted nipple, no mass, no nipple discharge, no skin change and no tenderness. Breasts are symmetrical.       Lymphadenopathy:        Right cervical: No superficial cervical, no deep cervical and no posterior cervical adenopathy present. Left cervical: No superficial cervical, no deep cervical and no posterior cervical adenopathy present. Right axillary: No pectoral and no lateral adenopathy present. Left axillary: No pectoral and no lateral adenopathy present. ASSESSMENT and PLAN    ICD-10-CM ICD-9-CM    1. Postoperative visit Z48.89 V58.49      Pt s/p LEFT ductal excision and doing well. Discussed benign results and advised pt to continue scheduling annual mammos. F/u prn. This plan was reviewed with the patient and patient agrees. All questions were answered.     Written by Dwayne Mariscal, as dictated by Dr. Denise Valero MD.

## 2017-12-18 NOTE — PATIENT INSTRUCTIONS
Breast Self-Exam: Care Instructions  Your Care Instructions    A breast self-exam is when you check your breasts for lumps or changes. This regular exam helps you learn how your breasts normally look and feel. Most breast problems or changes are not because of cancer. Breast self-exam is not a substitute for a mammogram. Having regular breast exams by your doctor and regular mammograms improve your chances of finding any problems with your breasts. Some women set a time each month to do a step-by-step breast self-exam. Other women like a less formal system. They might look at their breasts as they brush their teeth, or feel their breasts once in a while in the shower. If you notice a change in your breast, tell your doctor. Follow-up care is a key part of your treatment and safety. Be sure to make and go to all appointments, and call your doctor if you are having problems. It's also a good idea to know your test results and keep a list of the medicines you take. How do you do a breast self-exam?  · The best time to examine your breasts is usually one week after your menstrual period begins. Your breasts should not be tender then. If you do not have periods, you might do your exam on a day of the month that is easy to remember. · To examine your breasts:  ¨ Remove all your clothes above the waist and lie down. When you are lying down, your breast tissue spreads evenly over your chest wall, which makes it easier to feel all your breast tissue. ¨ Use the pads-not the fingertips-of the 3 middle fingers of your left hand to check your right breast. Move your fingers slowly in small coin-sized circles that overlap. ¨ Use three levels of pressure to feel of all your breast tissue. Use light pressure to feel the tissue close to the skin surface. Use medium pressure to feel a little deeper. Use firm pressure to feel your tissue close to your breastbone and ribs.  Use each pressure level to feel your breast tissue before moving on to the next spot. ¨ Check your entire breast, moving up and down as if following a strip from the collarbone to the bra line, and from the armpit to the ribs. Repeat until you have covered the entire breast.  ¨ Repeat this procedure for your left breast, using the pads of the 3 middle fingers of your right hand. · To examine your breasts while in the shower:  ¨ Place one arm over your head and lightly soap your breast on that side. ¨ Using the pads of your fingers, gently move your hand over your breast (in the strip pattern described above), feeling carefully for any lumps or changes. ¨ Repeat for the other breast.  · Have your doctor inspect anything you notice to see if you need further testing. Where can you learn more? Go to http://katiana-stella.info/. Enter P148 in the search box to learn more about \"Breast Self-Exam: Care Instructions. \"  Current as of: May 12, 2017  Content Version: 11.4  © 5859-5712 [employer]. Care instructions adapted under license by [employer] (which disclaims liability or warranty for this information). If you have questions about a medical condition or this instruction, always ask your healthcare professional. [place] [address] any warranty or liability for your use of this information.

## 2017-12-20 NOTE — COMMUNICATION BODY
HISTORY OF PRESENT ILLNESS  Reina Cam is a 41 y.o. female. HPI  ESTABLISHED patient here today for post op follow up. S/p LEFT ductal excision on 17. Her LEFT nipple remains tender. Denies any other breast problems at this time.      Breast history-  - bilateral breast reduction  17- LEFT ductal excision    Past Medical History:   Diagnosis Date    Hypertension     bp elevated patient to begin bp med after prpocedure today    Nipple discharge     left       Past Surgical History:   Procedure Laterality Date    HX BREAST LUMPECTOMY Left 2017    LEFT BREAST DUCTAL EXCISION performed by Leandro Vinson MD at [address] HX BREAST REDUCTION      HX HEMORRHOIDECTOMY         Social History     Social History    Marital status:      Spouse name: N/A    Number of children: N/A    Years of education: N/A     Occupational History    Not on file. Social History Main Topics    Smoking status: Never Smoker    Smokeless tobacco: Never Used    Alcohol use Yes      Comment: 1 drink per week    Drug use: Not on file    Sexual activity: Not on file     Other Topics Concern    Not on file     Social History Narrative       No current outpatient prescriptions on file prior to visit. No current facility-administered medications on file prior to visit. Allergies   Allergen Reactions    Sulfa (Sulfonamide Antibiotics) Swelling and Angioedema       OB History      Para Term  AB Living    3 2   1 2    SAB TAB Ectopic Molar Multiple Live Births                 Obstetric Comments    Menarche: 17. LMP: 10/1/17. # of Children:  2. Age at Delivery of First Child:  27.   Hysterectomy/oophorectomy:  NO/NO. Breast Bx:  no.  Hx of Breast Feeding:  no. BCP:  yes. Hormone therapy:  no.           ROS  Constitutional: Negative    HENT: Negative. Eyes: Negative. Respiratory: Negative. Cardiovascular: Negative.    Gastrointestinal: Negative. Genitourinary: Negative. Musculoskeletal: Negative. Skin: Negative. Neurological: Negative. Endo/Heme/Allergies: Negative. Psychiatric/Behavioral: Negative. Physical Exam   Cardiovascular: Normal rate and normal heart sounds. Pulmonary/Chest: Breath sounds normal. Right breast exhibits no inverted nipple, no mass, no nipple discharge, no skin change and no tenderness. Left breast exhibits no inverted nipple, no mass, no nipple discharge, no skin change and no tenderness. Breasts are symmetrical.       Lymphadenopathy:        Right cervical: No superficial cervical, no deep cervical and no posterior cervical adenopathy present. Left cervical: No superficial cervical, no deep cervical and no posterior cervical adenopathy present. Right axillary: No pectoral and no lateral adenopathy present. Left axillary: No pectoral and no lateral adenopathy present. ASSESSMENT and PLAN    ICD-10-CM ICD-9-CM    1. Postoperative visit Z48.89 V58.49      Pt s/p LEFT ductal excision and doing well. Discussed benign results and advised pt to continue scheduling annual mammos. F/u prn. This plan was reviewed with the patient and patient agrees. All questions were answered.     Written by Lora Gross, as dictated by Dr. Yanni Gibson MD.

## 2018-06-20 ENCOUNTER — OP HISTORICAL/CONVERTED ENCOUNTER (OUTPATIENT)
Dept: OTHER | Age: 48
End: 2018-06-20

## 2018-11-30 ENCOUNTER — OFFICE VISIT (OUTPATIENT)
Dept: SURGERY | Age: 48
End: 2018-11-30

## 2018-11-30 VITALS
HEIGHT: 69 IN | SYSTOLIC BLOOD PRESSURE: 148 MMHG | BODY MASS INDEX: 33.69 KG/M2 | WEIGHT: 227.5 LBS | DIASTOLIC BLOOD PRESSURE: 100 MMHG | HEART RATE: 64 BPM

## 2018-11-30 DIAGNOSIS — N64.4 MASTODYNIA OF LEFT BREAST: Primary | ICD-10-CM

## 2018-11-30 RX ORDER — AMLODIPINE BESYLATE 5 MG/1
5 TABLET ORAL DAILY
COMMUNITY
End: 2021-01-25 | Stop reason: ALTCHOICE

## 2018-11-30 NOTE — PROGRESS NOTES
HISTORY OF PRESENT ILLNESS  Gisele Shaikh is a 41 y.o. female. HPI ESTABLISHED patient here today for complaints of LEFT breast pain. The patient is S/P LEFT breast excisional biopsy in 12/2017 that was benign. She also had a BL breast reduction in 2001. The patient denies any palpable lumps,nipple inversion or discharge. She refers to the discomfort as an \"aching feeling. \" Her last mammogram done at [facility] 6/2018 and was BI RADS 1. Pain is in upper breast lateral edge. Review of Systems   All other systems reviewed and are negative. Physical Exam   Pulmonary/Chest: Right breast exhibits no inverted nipple, no mass, no nipple discharge, no skin change and no tenderness. Left breast exhibits tenderness (tender to palpation deeply at edge of pec). Left breast exhibits no inverted nipple, no mass, no nipple discharge and no skin change. Breasts are symmetrical.   Bilateral reduction scars   Lymphadenopathy:     She has no cervical adenopathy. She has no axillary adenopathy. Nursing note and vitals reviewed. BREAST ULTRASOUND  Indication: left breast pain 2:00 8 cfn  Technique: The area was scanned using a high-frequency linear-array near-field transducer  Findings: No abnormal mass, lesion, or shadowing noted. No cysts  Impression: Normal dense fibrocystic breast tissue   Disposition: No worrisome finding on ultrasound    ASSESSMENT and PLAN    ICD-10-CM ICD-9-CM    1.  Mastodynia of left breast N64.4 611.71      - musculoskeletal pain from pec muscle  - recommend nsaids, heat, stretches  - exam and us normal   - f/u prn

## 2019-06-28 ENCOUNTER — OP HISTORICAL/CONVERTED ENCOUNTER (OUTPATIENT)
Dept: OTHER | Age: 49
End: 2019-06-28

## 2020-08-03 ENCOUNTER — OP HISTORICAL/CONVERTED ENCOUNTER (OUTPATIENT)
Dept: OTHER | Age: 50
End: 2020-08-03

## 2020-12-21 DIAGNOSIS — B96.89 BV (BACTERIAL VAGINOSIS): Primary | ICD-10-CM

## 2020-12-21 DIAGNOSIS — N76.0 BV (BACTERIAL VAGINOSIS): Primary | ICD-10-CM

## 2020-12-21 RX ORDER — METRONIDAZOLE 500 MG/1
TABLET ORAL
Qty: 14 TAB | Refills: 0 | Status: SHIPPED | OUTPATIENT
Start: 2020-12-21 | End: 2021-01-25 | Stop reason: ALTCHOICE

## 2021-01-06 VITALS
DIASTOLIC BLOOD PRESSURE: 90 MMHG | HEART RATE: 59 BPM | BODY MASS INDEX: 36.08 KG/M2 | WEIGHT: 243.6 LBS | SYSTOLIC BLOOD PRESSURE: 140 MMHG | HEIGHT: 69 IN

## 2021-01-06 PROBLEM — A60.04 RECURRENT HERPETIC VULVOVAGINITIS: Status: ACTIVE | Noted: 2021-01-06

## 2021-01-06 PROBLEM — I10 ESSENTIAL HYPERTENSION: Status: ACTIVE | Noted: 2021-01-06

## 2021-01-06 RX ORDER — VALACYCLOVIR HYDROCHLORIDE 500 MG/1
TABLET, FILM COATED ORAL 2 TIMES DAILY
COMMUNITY
End: 2022-08-17

## 2021-01-22 VITALS — HEIGHT: 69 IN | BODY MASS INDEX: 35.97 KG/M2

## 2021-01-25 ENCOUNTER — OFFICE VISIT (OUTPATIENT)
Dept: OBGYN CLINIC | Age: 51
End: 2021-01-25
Payer: COMMERCIAL

## 2021-01-25 VITALS
SYSTOLIC BLOOD PRESSURE: 130 MMHG | HEIGHT: 69 IN | BODY MASS INDEX: 38.36 KG/M2 | DIASTOLIC BLOOD PRESSURE: 78 MMHG | WEIGHT: 259 LBS

## 2021-01-25 DIAGNOSIS — B37.31 MONILIAL VULVOVAGINITIS: ICD-10-CM

## 2021-01-25 PROCEDURE — 99212 OFFICE O/P EST SF 10 MIN: CPT | Performed by: OBSTETRICS & GYNECOLOGY

## 2021-01-25 RX ORDER — FLUCONAZOLE 150 MG/1
150 TABLET ORAL
Qty: 2 TAB | Refills: 0 | Status: SHIPPED | OUTPATIENT
Start: 2021-01-25 | End: 2021-01-25

## 2021-01-25 NOTE — PROGRESS NOTES
Marti Reyes is a 52 y.o. female who presents today for the following:  Chief Complaint   Patient presents with    Vaginal Discharge          HPI  Patient is a 61-year-old G8, P2 80 female who presents today with complaints of vaginal discharge with odor. She reports that this has been present for several weeks. She states that she is contacted the office previously and had Flagyl called in and taken a course of Flagyl for this problem but shortly after completing it the problem seem to return. OB History        8    Para   2    Term   2            AB   6    Living   2       SAB   3    TAB   3    Ectopic        Molar        Multiple        Live Births              Obstetric Comments   Menarche: 17. LMP: 10/1/17. # of Children:  2. Age at Delivery of First Child:  27.   Hysterectomy/oophorectomy:  NO/NO. Breast Bx:  no.  Hx of Breast Feeding:  no. BCP:  yes. Hormone therapy:  no.                    /78 (BP 1 Location: Left arm, BP Patient Position: Sitting)   Ht 5' 9\" (1.753 m)   Wt 117.5 kg (259 lb)   LMP 2021   BMI 38.25 kg/m²    OBGyn Exam   Patient is well-developed well-nourished female no apparent distress  She is alert and oriented x3  Pelvic  External genitalia within normal limits  Urethra is midline there are no apparent urethral lesions bladder is within normal limits  Vagina is with normal rugae there is a thick white vaginal discharge present in the vaginal vault consistent with yeast vaginitis  NU swab was obtained  No results found for this visit on 21. Assessment:  Yeast vaginitis  Plan fluconazole Rx sent    Orders Placed This Encounter    fluconazole (Diflucan) 150 mg tablet     Sig: Take 1 Tab by mouth now for 1 dose. Take 1 by mouth now and repeat in 4 days  Indications: a yeast infection of the vagina and vulva     Dispense:  2 Tab     Refill:  0       Follow-up and Dispositions    · Return if symptoms worsen or fail to improve.

## 2021-01-28 LAB
A VAGINAE DNA VAG QL NAA+PROBE: ABNORMAL SCORE
BVAB2 DNA VAG QL NAA+PROBE: ABNORMAL SCORE
C ALBICANS DNA VAG QL NAA+PROBE: POSITIVE
C GLABRATA DNA VAG QL NAA+PROBE: NEGATIVE
C KRUSEI DNA VAG QL NAA+PROBE: NEGATIVE
C LUSITANIAE DNA VAG QL NAA+PROBE: NEGATIVE
C TRACH DNA VAG QL NAA+PROBE: NEGATIVE
CANDIDA DNA VAG QL NAA+PROBE: NEGATIVE
MEGA1 DNA VAG QL NAA+PROBE: ABNORMAL SCORE
N GONORRHOEA DNA VAG QL NAA+PROBE: NEGATIVE
T VAGINALIS DNA VAG QL NAA+PROBE: NEGATIVE

## 2021-05-19 ENCOUNTER — OFFICE VISIT (OUTPATIENT)
Dept: SURGERY | Age: 51
End: 2021-05-19
Payer: COMMERCIAL

## 2021-05-19 VITALS
HEIGHT: 69 IN | WEIGHT: 259 LBS | DIASTOLIC BLOOD PRESSURE: 89 MMHG | BODY MASS INDEX: 38.36 KG/M2 | SYSTOLIC BLOOD PRESSURE: 160 MMHG | HEART RATE: 58 BPM

## 2021-05-19 DIAGNOSIS — N64.4 BREAST PAIN: Primary | ICD-10-CM

## 2021-05-19 DIAGNOSIS — M54.2 NECK PAIN: ICD-10-CM

## 2021-05-19 PROCEDURE — 99213 OFFICE O/P EST LOW 20 MIN: CPT | Performed by: SURGERY

## 2021-05-19 RX ORDER — HYDROCHLOROTHIAZIDE 25 MG/1
TABLET ORAL
COMMUNITY
Start: 2021-04-20 | End: 2022-08-17

## 2021-05-19 NOTE — PROGRESS NOTES
HISTORY OF PRESENT ILLNESS  Marcela Del Valle is a 52 y.o. female. HPI  ESTABLISHED patient here for pain in bilateral axillae and also \"bruising\" along bra line. Reports that pain is worse on the LEFT side than on the RIGHT. Pt also notes neck pain. She denies recent strenuous activity.     Breast history-  11/16/17 - LEFT breast ductal excision for nipple discharge- Benign breast tissue. No histologic correlate for nipple discharge identified. 2001 - BL breast reduction.     No family history of breast or ovarian cancer.     Breast imaging-  Mammogram done at [facility].       Past Medical History:   Diagnosis Date    Herpes     Hypertension     bp elevated patient to begin bp med after prpocedure today    Nipple discharge     left       Past Surgical History:   Procedure Laterality Date    HX BREAST LUMPECTOMY Left 11/16/2017    LEFT BREAST DUCTAL EXCISION [facility] Moiz [facility]verson [facility]    HX BREAST REDUCTION  2001    HX GYN      BTL    HX HEMORRHOIDECTOMY  2014    HX ORTHOPAEDIC      HX TONSILLECTOMY      AK LAP,TUBAL CAUTERY         Social History     Socioeconomic History    Marital status:      Spouse name: Not on file    Number of children: Not on file    Years of education: Not on file    Highest education level: Not on file   Occupational History    Not on file   Tobacco Use    Smoking status: Never Smoker    Smokeless tobacco: Never Used   Vaping Use    Vaping Use: Never used   Substance and Sexual Activity    Alcohol use: Yes     Comment: 1 drink per week    Drug use: No    Sexual activity: Yes     Partners: Male     Birth control/protection: Surgical     Comment: BTL   Other Topics Concern    Not on file   Social History Narrative    Not on file     Social Determinants of Health     Financial Resource Strain:     Difficulty of Paying Living Expenses:    Food Insecurity:     Worried About Running Out of Food in the Last Year:     [employer] in the Last Year:    Transportation Needs:     Lack of Transportation (Medical):  Lack of Transportation (Non-Medical):    Physical Activity:     Days of Exercise per Week:     Minutes of Exercise per Session:    Stress:     Feeling of Stress :    Social Connections:     Frequency of Communication with Friends and Family:     Frequency of Social Gatherings with Friends and Family:     Attends Oriental orthodox Services:     Active Member of Clubs or Organizations:     Attends Club or Organization Meetings:     Marital Status:    Intimate Partner Violence:     Fear of Current or Ex-Partner:     Emotionally Abused:     Physically Abused:     Sexually Abused:        Current Outpatient Medications on File Prior to Visit   Medication Sig Dispense Refill    hydroCHLOROthiazide (HYDRODIURIL) 25 mg tablet TAKE 1 TABLET BY MOUTH EVERY DAY      valACYclovir (VALTREX) 500 mg tablet Take  by mouth two (2) times a day. No current facility-administered medications on file prior to visit. Allergies   Allergen Reactions    Sulfa (Sulfonamide Antibiotics) Swelling and Angioedema       OB History        8    Para   2    Term   2            AB   6    Living   2       SAB   3    TAB   3    Ectopic        Molar        Multiple        Live Births              Obstetric Comments   Menarche: 17. LMP: 10/1/17. # of Children:  2. Age at Delivery of First Child:  27.   Hysterectomy/oophorectomy:  NO/NO. Breast Bx:  no.  Hx of Breast Feeding:  no. BCP:  yes. Hormone therapy:  no.              ROS      Physical Exam  Exam conducted with a chaperone present. Cardiovascular:      Rate and Rhythm: Normal rate and regular rhythm. Heart sounds: Normal heart sounds. Pulmonary:      Breath sounds: Normal breath sounds. Chest:      Breasts: Breasts are symmetrical.         Right: Skin change (bruise) and tenderness present. No swelling, bleeding, inverted nipple, mass or nipple discharge. Left: Tenderness present. No swelling, bleeding, inverted nipple, mass, nipple discharge or skin change. Lymphadenopathy:      Cervical:      Right cervical: No superficial, deep or posterior cervical adenopathy. Left cervical: No superficial, deep or posterior cervical adenopathy. Upper Body:      Right upper body: No supraclavicular or axillary adenopathy. Left upper body: No supraclavicular or axillary adenopathy. ASSESSMENT and PLAN    ICD-10-CM ICD-9-CM    1. Breast pain  N64.4 611.71    2. Neck pain  M54.2 723.1       Established patient presents for evaluation of pain in bilateral axillae and also \"bruising\" along bra line, and is doing well overall. Well healed reduction incisions bilaterally. Tenderness laterally and posteriorly on BL exam, with bruise on the RIGHT side near the bra line. Exam otherwise normal. Advised formal bra fitting. Also discussed that breast and neck discomfort may be related to breast size. Pt notes reduction about 20 years ago, but states breasts have enlarged since then. Will refer to plastics for evaluation for reduction. F/U with me PRN. This plan was reviewed with the patient and patient agrees. All questions were answered. Total time spent was 40 minutes.     Written by Stanton Husain, as dictated by Dr. Yazmin Quevedo MD.

## 2021-05-19 NOTE — PROGRESS NOTES
HISTORY OF PRESENT ILLNESS Maria Del Carmen Gutierres is a 52 y.o. female. HPI ESTABLISHED patient here for pain in bilateral axillae and also \"bruising\" along bra line. Reports that pain is worse on the LEFT side than on the RIGHT. Breast history- 
12/201 - LEFT breast ductal excision for nipple discharge- Benign breast tissue. No histologic correlate for nipple discharge identified No family history of breast or ovarian cancer. Breast imaging- 
Mammogram done at [facility]. ROS Physical Exam 
 
ASSESSMENT and PLAN 
{ASSESSMENT/PLAN:48010}

## 2021-11-12 ENCOUNTER — TELEPHONE (OUTPATIENT)
Dept: OBGYN CLINIC | Age: 51
End: 2021-11-12

## 2021-11-12 DIAGNOSIS — B00.9 HSV INFECTION: Primary | ICD-10-CM

## 2021-11-12 RX ORDER — VALACYCLOVIR HYDROCHLORIDE 500 MG/1
500 TABLET, FILM COATED ORAL 2 TIMES DAILY
Qty: 10 TABLET | Refills: 1 | Status: SHIPPED | OUTPATIENT
Start: 2021-11-12 | End: 2021-11-17

## 2021-12-13 ENCOUNTER — OFFICE VISIT (OUTPATIENT)
Dept: OBGYN CLINIC | Age: 51
End: 2021-12-13
Payer: COMMERCIAL

## 2021-12-13 VITALS
TEMPERATURE: 96.9 F | DIASTOLIC BLOOD PRESSURE: 110 MMHG | SYSTOLIC BLOOD PRESSURE: 148 MMHG | HEART RATE: 69 BPM | OXYGEN SATURATION: 99 % | WEIGHT: 259.06 LBS | BODY MASS INDEX: 38.37 KG/M2 | RESPIRATION RATE: 16 BRPM | HEIGHT: 69 IN

## 2021-12-13 DIAGNOSIS — Z12.4 SCREENING FOR MALIGNANT NEOPLASM OF CERVIX: ICD-10-CM

## 2021-12-13 DIAGNOSIS — A60.00 RECURRENT GENITAL HERPES: ICD-10-CM

## 2021-12-13 DIAGNOSIS — Z12.31 ENCOUNTER FOR SCREENING MAMMOGRAM FOR MALIGNANT NEOPLASM OF BREAST: ICD-10-CM

## 2021-12-13 DIAGNOSIS — Z01.419 ROUTINE GYNECOLOGICAL EXAMINATION: Primary | ICD-10-CM

## 2021-12-13 PROCEDURE — 99396 PREV VISIT EST AGE 40-64: CPT | Performed by: OBSTETRICS & GYNECOLOGY

## 2021-12-13 RX ORDER — VALACYCLOVIR HYDROCHLORIDE 500 MG/1
500 TABLET, FILM COATED ORAL DAILY
Qty: 30 TABLET | Refills: 12 | Status: SHIPPED | OUTPATIENT
Start: 2021-12-13 | End: 2022-08-17

## 2021-12-13 NOTE — PROGRESS NOTES
1. Have you been to the ER, urgent care clinic since your last visit? Hospitalized since your last visit? ER    2. Have you seen or consulted any other health care providers outside of the [address] [place] [address] since your last visit? Include any pap smears or colon screening. Er     Visit Vitals  BP (!) 148/110 (BP 1 Location: Left upper arm, BP Patient Position: Sitting, BP Cuff Size: Adult)   Pulse 69   Temp 96.9 °F (36.1 °C) (Temporal)   Resp 16   Ht 5' 9\" (1.753 m)   Wt 259 lb 1 oz (117.5 kg)   LMP 11/09/2021 (Exact Date)   SpO2 99%   BMI 38.26 kg/m²       Chief Complaint   Patient presents with    Annual Exam     c/o vaginal itching

## 2021-12-13 NOTE — PROGRESS NOTES
Smiley Foster is a 54 y.o. female who presents today for the following:  Chief Complaint   Patient presents with    Annual Exam     c/o vaginal itching        Allergies   Allergen Reactions    Sulfa (Sulfonamide Antibiotics) Swelling and Angioedema       Current Outpatient Medications   Medication Sig    valACYclovir (VALTREX) 500 mg tablet Take 1 Tablet by mouth daily. Indications: recurrent genital herpes    hydroCHLOROthiazide (HYDRODIURIL) 25 mg tablet TAKE 1 TABLET BY MOUTH EVERY DAY    valACYclovir (VALTREX) 500 mg tablet Take  by mouth two (2) times a day. No current facility-administered medications for this visit.        Past Medical History:   Diagnosis Date    Herpes     Hypertension     bp elevated patient to begin bp med after prpocedure today    Nipple discharge     left       Past Surgical History:   Procedure Laterality Date    HX BREAST LUMPECTOMY Left 11/16/2017    LEFT BREAST DUCTAL EXCISION performed by Lulú Jimenez MD at [facility]    HX BREAST REDUCTION  2001    HX GYN      BTL    HX HEMORRHOIDECTOMY  2014    HX ORTHOPAEDIC      HX TONSILLECTOMY      [place] LAP,TUBAL CAUTERY         Family History   Problem Relation Age of Onset    Heart Disease Mother     Hypertension Mother     Cancer Sister         cervical and stomach    Hypertension Sister     Hypertension Brother        Social History     Socioeconomic History    Marital status:      Spouse name: Not on file    Number of children: Not on file    Years of education: Not on file    Highest education level: Not on file   Occupational History    Not on file   Tobacco Use    Smoking status: Never Smoker    Smokeless tobacco: Never Used   Vaping Use    Vaping Use: Never used   Substance and Sexual Activity    Alcohol use: Yes     Comment: 1 drink per week    Drug use: No    Sexual activity: Yes     Partners: Male     Birth control/protection: Surgical     Comment: BTL   Other Topics Concern    Not on file   Social History Narrative    Not on file     Social Determinants of Health     Financial Resource Strain:     Difficulty of Paying Living Expenses: Not on file   Food Insecurity:     Worried About Running Out of Food in the Last Year: Not on file    [place] of Food in the Last Year: Not on file   Transportation Needs:     Lack of Transportation (Medical): Not on file    Lack of Transportation (Non-Medical): Not on file   Physical Activity:     Days of Exercise per Week: Not on file    Minutes of Exercise per Session: Not on file   Stress:     Feeling of Stress : Not on file   Social Connections:     Frequency of Communication with Friends and Family: Not on file    Frequency of Social Gatherings with Friends and Family: Not on file    Attends Orthodox Services: Not on file    Active Member of [address] [place] [address] [employer] or Organizations: Not on file    Attends Club or Organization Meetings: Not on file    Marital Status: Not on file   Intimate Partner Violence:     Fear of Current or Ex-Partner: Not on file    Emotionally Abused: Not on file    Physically Abused: Not on file    Sexually Abused: Not on file   Housing Stability:     Unable to Pay for Housing in the Last Year: Not on file    Number of [place] in the Last Year: Not on file    Unstable Housing in the Last Year: Not on file         HPI  Here today for annual exam.  Patient has history of recurrent genital herpes. ROS   Review of Systems   Constitutional: Negative. HENT: Negative. Eyes: Negative. Respiratory: Negative. Cardiovascular: Negative. Gastrointestinal: Negative. Genitourinary: Negative. Musculoskeletal: Negative. Skin: Negative. Neurological: Negative. Endo/Heme/Allergies: Negative. Psychiatric/Behavioral: Negative.       BP (!) 148/110 (BP 1 Location: Left upper arm, BP Patient Position: Sitting, BP Cuff Size: Adult)   Pulse 69   Temp 96.9 °F (36.1 °C) (Temporal)   Resp 16 Ht 5' 9\" (1.753 m)   Wt 259 lb 1 oz (117.5 kg)   LMP 11/09/2021 (Exact Date)   SpO2 99%   BMI 38.26 kg/m²    OBGyn Exam   Constitutional  · Appearance: well-nourished, well developed, alert, in no acute distress    HENT  · Head and Face: appears normal    Neck  · Inspection/Palpation: normal appearance, no masses or tenderness  · Lymph Nodes: no lymphadenopathy present  · Thyroid: gland size normal, nontender, no nodules or masses present on palpation    Breasts   Symmetric, no palpable masses, no tenderness, no skin changes, no nipple abnormality, no nipple discharge, no lymphadenopathy. Old scars, breast reduction surgery.     Chest  · Respiratory Effort: Even and unlabored  · Auscultation: normal breath sounds    Cardiovascular  · Heart:  · Auscultation: regular rate and rhythm without murmur    Gastrointestinal  · Abdominal Examination: abdomen non-tender to palpation, normal bowel sounds, no masses present  · Liver and spleen: no hepatomegaly present, spleen not palpable  · Hernias: no hernias identified    Genitourinary  · External Genitalia: normal appearance for age, no discharge present, no tenderness present, no inflammatory lesions present, no masses present, no atrophy present  · Vagina: normal vaginal vault without central or paravaginal defects, no discharge present, no inflammatory lesions present, no masses present  · Bladder: non-tender to palpation  · Urethra: appears normal  · Cervix: normal   · Uterus: normal size, shape and consistency  · Adnexa: no adnexal tenderness present, no adnexal masses present  · Perineum: perineum within normal limits, no evidence of trauma, no rashes or skin lesions present  · Anus: anus within normal limits, no hemorrhoids present  · Inguinal Lymph Nodes: no lymphadenopathy present    Skin  · General Inspection: no rash, no lesions identified    Neurologic/Psychiatric  · Mental Status:  · Orientation: grossly oriented to person, place and time  · Mood and Affect: mood normal, affect appropriate    No results found for this visit on 12/13/21. Orders Placed This Encounter    ROBBY 3D YOSEPH W MAMMO BI SCREENING INCL CAD     Standing Status:   Future     Standing Expiration Date:   12/13/2022     Order Specific Question:   Is Patient Pregnant? Answer:   No     Order Specific Question:   Reason for Exam     Answer:   Screening    valACYclovir (VALTREX) 500 mg tablet     Sig: Take 1 Tablet by mouth daily. Indications: recurrent genital herpes     Dispense:  30 Tablet     Refill:  12    PAP IG, CT-NG-TV, APTIMA HPV AND RFX 54/43,94([id number]) ([facility])     Order Specific Question:   Pap Source? Answer:   Endocervical     Order Specific Question:   Total Hysterectomy? Answer:   No     Order Specific Question:   Supracervical Hysterectomy? Answer:   No     Order Specific Question:   Post Menopausal?     Answer:   No     Order Specific Question:   Hormone Therapy? Answer:   No     Order Specific Question:   IUD? Answer:   No     Order Specific Question:   Abnormal Bleeding? Answer:   No     Order Specific Question:   Pregnant     Answer:   No     Order Specific Question:   Post Partum? Answer:   No     Order Specific Question:   Date of LMP? Answer:   11/8/2021     Order Specific Question:   Pap collection method? Answer:   broom         1. Routine gynecological examination  Reviewed Pap smear/HPV, mammogram, bone density colonoscopy testing guidelines. Encouraged healthy lifestyle. Discussed importanceof getting annual exams. Discussed the risk of osteoporosis and recommended 1200 to 1500 mg of calcium as well as vitamin D supplementation daily. Recommended monthly self breast exams and instructed and demonstrated to the patient on the proper technique educated on the importance of healthy weight management and the significance of not smoking.     2. Screening for malignant neoplasm of cervix    - PAP IG, CT-NG-TV, APTIMA HPV AND [address] 52/66,52([id number])    3. Encounter for screening mammogram for malignant neoplasm of breast    - ROBBY 3D YOSEPH W MAMMO BI SCREENING INCL CAD; Future    4. Recurrent genital herpes    - valACYclovir (VALTREX) 500 mg tablet; Take 1 Tablet by mouth daily. Indications: recurrent genital herpes  Dispense: 30 Tablet;  Refill: 12        Follow-up and Dispositions    · Return in about 1 year (around 12/13/2022) for Annual Exam.

## 2021-12-18 LAB
C TRACH RRNA CVX QL NAA+PROBE: NEGATIVE
CYTOLOGIST CVX/VAG CYTO: NORMAL
CYTOLOGY CVX/VAG DOC CYTO: NORMAL
CYTOLOGY CVX/VAG DOC THIN PREP: NORMAL
DX ICD CODE: NORMAL
HPV I/H RISK 4 DNA CVX QL PROBE+SIG AMP: NEGATIVE
Lab: NORMAL
N GONORRHOEA RRNA CVX QL NAA+PROBE: NEGATIVE
OTHER STN SPEC: NORMAL
STAT OF ADQ CVX/VAG CYTO-IMP: NORMAL
T VAGINALIS RRNA SPEC QL NAA+PROBE: NEGATIVE

## 2022-02-17 ENCOUNTER — HOSPITAL ENCOUNTER (OUTPATIENT)
Dept: MAMMOGRAPHY | Age: 52
Discharge: HOME | End: 2022-02-17
Attending: OBSTETRICS & GYNECOLOGY
Payer: COMMERCIAL

## 2022-02-17 DIAGNOSIS — Z12.31 ENCOUNTER FOR SCREENING MAMMOGRAM FOR MALIGNANT NEOPLASM OF BREAST: ICD-10-CM

## 2022-02-17 PROCEDURE — 77063 BREAST TOMOSYNTHESIS BI: CPT

## 2022-03-01 ENCOUNTER — TELEPHONE (OUTPATIENT)
Dept: OBGYN CLINIC | Age: 52
End: 2022-03-01

## 2022-03-01 NOTE — TELEPHONE ENCOUNTER
Spoke with pt and advised her that her Mammogram results were Normal. Advised pt to return in 1 year at or around 2/18/2023 per Dr. Johnathan Tran.

## 2022-03-01 NOTE — TELEPHONE ENCOUNTER
----- Message from Tamanna Conti RN sent at 2/28/2022  2:47 PM EST -----    ----- Message -----  From: Jose Perez MD  Sent: 2/22/2022   9:58 AM EST  To: Ayaz Ortiz LPN    Please notify patient about normal results.   Thank you

## 2022-03-19 PROBLEM — A60.04 RECURRENT HERPETIC VULVOVAGINITIS: Status: ACTIVE | Noted: 2021-01-06

## 2022-03-20 PROBLEM — I10 ESSENTIAL HYPERTENSION: Status: ACTIVE | Noted: 2021-01-06

## 2022-05-18 ENCOUNTER — OFFICE VISIT (OUTPATIENT)
Dept: OBGYN CLINIC | Age: 52
End: 2022-05-18
Payer: COMMERCIAL

## 2022-05-18 VITALS
HEIGHT: 69 IN | WEIGHT: 247.19 LBS | HEART RATE: 70 BPM | DIASTOLIC BLOOD PRESSURE: 100 MMHG | SYSTOLIC BLOOD PRESSURE: 147 MMHG | TEMPERATURE: 97.8 F | BODY MASS INDEX: 36.61 KG/M2 | OXYGEN SATURATION: 99 %

## 2022-05-18 DIAGNOSIS — N93.9 ABNORMAL UTERINE BLEEDING (AUB): Primary | ICD-10-CM

## 2022-05-18 PROCEDURE — 99213 OFFICE O/P EST LOW 20 MIN: CPT | Performed by: OBSTETRICS & GYNECOLOGY

## 2022-05-18 RX ORDER — AMLODIPINE BESYLATE 5 MG/1
5 TABLET ORAL DAILY
COMMUNITY
Start: 2022-04-21

## 2022-05-18 NOTE — PROGRESS NOTES
Jadyn Nguyen is a 54 y.o. female who presents today for the following:  Chief Complaint   Patient presents with    Irregular Menses     every two weeks, can be heavy at times and last up to 10 days        Allergies   Allergen Reactions    Sulfa (Sulfonamide Antibiotics) Swelling and Angioedema       Current Outpatient Medications   Medication Sig    amLODIPine (NORVASC) 5 mg tablet     valACYclovir (VALTREX) 500 mg tablet Take  by mouth two (2) times a day.  valACYclovir (VALTREX) 500 mg tablet Take 1 Tablet by mouth daily. Indications: recurrent genital herpes    hydroCHLOROthiazide (HYDRODIURIL) 25 mg tablet TAKE 1 TABLET BY MOUTH EVERY DAY (Patient not taking: Reported on 5/18/2022)     No current facility-administered medications for this visit.        Past Medical History:   Diagnosis Date    Herpes     Hypertension     bp elevated patient to begin bp med after prpocedure today    Nipple discharge     left       Past Surgical History:   Procedure Laterality Date    HX BREAST BIOPSY Left     milk duct removed 4 yrs ago    HX BREAST LUMPECTOMY Left 11/16/2017    LEFT BREAST DUCTAL EXCISION performed by Nilda [facility]    HX BREAST REDUCTION  2001    HX GYN      BTL    HX HEMORRHOIDECTOMY  2014    HX ORTHOPAEDIC      HX TONSILLECTOMY      [place] LAP,TUBAL CAUTERY         Family History   Problem Relation Age of Onset    Heart Disease Mother     Hypertension Mother     Cancer Sister         cervical and stomach    Hypertension Sister     Hypertension Brother        Social History     Socioeconomic History    Marital status:      Spouse name: Not on file    Number of children: Not on file    Years of education: Not on file    Highest education level: Not on file   Occupational History    Not on file   Tobacco Use    Smoking status: Never Smoker    Smokeless tobacco: Never Used   Vaping Use    Vaping Use: Never used   Substance and Sexual Activity  Alcohol use: Yes     Comment: 1 drink per week    Drug use: No    Sexual activity: Yes     Partners: Male     Birth control/protection: Surgical     Comment: BTL   Other Topics Concern    Not on file   Social History Narrative    Not on file     Social Determinants of Health     Financial Resource Strain:     Difficulty of Paying Living Expenses: Not on file   Food Insecurity:     Worried About Running Out of Food in the Last Year: Not on file    [place] of Food in the Last Year: Not on file   Transportation Needs:     Lack of Transportation (Medical): Not on file    Lack of Transportation (Non-Medical): Not on file   Physical Activity:     Days of Exercise per Week: Not on file    Minutes of Exercise per Session: Not on file   Stress:     Feeling of Stress : Not on file   Social Connections:     Frequency of Communication with Friends and Family: Not on file    Frequency of Social Gatherings with Friends and Family: Not on file    Attends Worship Services: Not on file    Active Member of [address] [place] [address] [employer] or Organizations: Not on file    Attends Club or Organization Meetings: Not on file    Marital Status: Not on file   Intimate Partner Violence:     Fear of Current or Ex-Partner: Not on file    Emotionally Abused: Not on file    Physically Abused: Not on file    Sexually Abused: Not on file   Housing Stability:     Unable to Pay for Housing in the Last Year: Not on file    Number of [place] in the Last Year: Not on file    Unstable Housing in the Last Year: Not on file         HPI  54years old patient presented today with complaints of irregular and sometimes heavy menstrual cycles. ROS   Review of Systems   Constitutional: Negative. HENT: Negative. Eyes: Negative. Respiratory: Negative. Cardiovascular: Negative. Gastrointestinal: Negative. Genitourinary: Positive for irregular and heavy menstrual cycles  Musculoskeletal: Negative. Skin: Negative.     Neurological: Negative. Endo/Heme/Allergies: Negative. Psychiatric/Behavioral: Negative.       BP (!) 147/100 (BP 1 Location: Left upper arm, BP Patient Position: Sitting, BP Cuff Size: Adult)   Pulse 70   Temp 97.8 °F (36.6 °C) (Temporal)   Ht 5' 9\" (1.753 m)   Wt 247 lb 3 oz (112.1 kg)   LMP 04/18/2022 (Approximate)   SpO2 99%   BMI 36.50 kg/m²    OBGyn Exam   Constitutional  · Appearance: well-nourished, well developed, alert, in no acute distress    HENT  · Head and Face: appears normal    Neck  · Inspection/Palpation: normal appearance, no masses or tenderness  · Lymph Nodes: no lymphadenopathy present  · Thyroid: gland size normal, nontender, no nodules or masses present on palpation    Chest  · Respiratory Effort: Even and unlabored  · Auscultation: normal breath sounds    Cardiovascular  · Heart:  · Auscultation: regular rate and rhythm without murmur    Gastrointestinal  · Abdominal Examination: abdomen non-tender to palpation, normal bowel sounds, no masses present  · Liver and spleen: no hepatomegaly present, spleen not palpable  · Hernias: no hernias identified    Genitourinary  · External Genitalia: normal appearance for age, no discharge present, no tenderness present, no inflammatory lesions present, no masses present, no atrophy present  · Vagina: normal vaginal vault without central or paravaginal defects, no discharge present, no inflammatory lesions present, no masses present  · Bladder: non-tender to palpation  · Urethra: appears normal  · Cervix: normal   · Uterus: Anterior, approximately 10 cm in size, difficult to assess due to patient's body habitus  · Adnexa: no adnexal tenderness present, no adnexal masses present  · Perineum: perineum within normal limits, no evidence of trauma, no rashes or skin lesions present  · Anus: anus within normal limits, no hemorrhoids present  · Inguinal Lymph Nodes: no lymphadenopathy present    Skin  · General Inspection: no rash, no lesions identified    Neurologic/Psychiatric  · Mental Status:  · Orientation: grossly oriented to person, place and time  · Mood and Affect: mood normal, affect appropriate    No results found for this visit on 05/18/22. Orders Placed This Encounter    US TRANSVAGINAL     Standing Status:   Future     Standing Expiration Date:   6/18/2022     Order Specific Question:   Is Patient Pregnant? Answer:   No    CBC WITH AUTOMATED DIFF    amLODIPine (NORVASC) 5 mg tablet         1. Abnormal uterine bleeding (AUB)    - CBC WITH AUTOMATED DIFF  - US TRANSVAGINAL; Future    Pelvic ultrasound ordered for endometrial/uterine assessment. Endometrial biopsy may be in order (office endometrial biopsy versus hysteroscopy depending on pelvic ultrasound findings). Follow-up and Dispositions    · Return in about 3 weeks (around 6/8/2022) for Discuss ultrasound results.

## 2022-05-19 LAB
BASOPHILS # BLD AUTO: 0 X10E3/UL (ref 0–0.2)
BASOPHILS NFR BLD AUTO: 0 %
EOSINOPHIL # BLD AUTO: 0.1 X10E3/UL (ref 0–0.4)
EOSINOPHIL NFR BLD AUTO: 2 %
ERYTHROCYTE [DISTWIDTH] IN BLOOD BY AUTOMATED COUNT: 12.9 % (ref 11.7–15.4)
HCT VFR BLD AUTO: 39.4 % (ref 34–46.6)
HGB BLD-MCNC: 12.8 G/DL (ref 11.1–15.9)
IMM GRANULOCYTES # BLD AUTO: 0 X10E3/UL (ref 0–0.1)
IMM GRANULOCYTES NFR BLD AUTO: 0 %
LYMPHOCYTES # BLD AUTO: 2.3 X10E3/UL (ref 0.7–3.1)
LYMPHOCYTES NFR BLD AUTO: 44 %
MCH RBC QN AUTO: 30.5 PG (ref 26.6–33)
MCHC RBC AUTO-ENTMCNC: 32.5 G/DL (ref 31.5–35.7)
MCV RBC AUTO: 94 FL (ref 79–97)
MONOCYTES # BLD AUTO: 0.4 X10E3/UL (ref 0.1–0.9)
MONOCYTES NFR BLD AUTO: 8 %
NEUTROPHILS # BLD AUTO: 2.4 X10E3/UL (ref 1.4–7)
NEUTROPHILS NFR BLD AUTO: 46 %
PLATELET # BLD AUTO: 312 X10E3/UL (ref 150–450)
RBC # BLD AUTO: 4.19 X10E6/UL (ref 3.77–5.28)
WBC # BLD AUTO: 5.1 X10E3/UL (ref 3.4–10.8)

## 2022-06-01 ENCOUNTER — HOSPITAL ENCOUNTER (OUTPATIENT)
Dept: ULTRASOUND IMAGING | Age: 52
Discharge: HOME | End: 2022-06-01
Attending: OBSTETRICS & GYNECOLOGY
Payer: COMMERCIAL

## 2022-06-01 DIAGNOSIS — N93.9 ABNORMAL UTERINE BLEEDING (AUB): ICD-10-CM

## 2022-06-01 PROCEDURE — 76830 TRANSVAGINAL US NON-OB: CPT

## 2022-06-01 NOTE — PROGRESS NOTES
Fibroid uterus. Patient has already an appointment to discuss pelvic ultrasound findings and the possibility of hysteroscopy.

## 2022-06-16 ENCOUNTER — OFFICE VISIT (OUTPATIENT)
Dept: OBGYN CLINIC | Age: 52
End: 2022-06-16
Payer: COMMERCIAL

## 2022-06-16 VITALS
WEIGHT: 251.56 LBS | HEART RATE: 62 BPM | HEIGHT: 69 IN | OXYGEN SATURATION: 100 % | DIASTOLIC BLOOD PRESSURE: 109 MMHG | TEMPERATURE: 96.9 F | BODY MASS INDEX: 37.26 KG/M2 | SYSTOLIC BLOOD PRESSURE: 156 MMHG

## 2022-06-16 DIAGNOSIS — N93.9 ABNORMAL UTERINE BLEEDING (AUB): ICD-10-CM

## 2022-06-16 DIAGNOSIS — D25.9 UTERINE LEIOMYOMA, UNSPECIFIED LOCATION: Primary | ICD-10-CM

## 2022-06-16 PROCEDURE — 99213 OFFICE O/P EST LOW 20 MIN: CPT | Performed by: OBSTETRICS & GYNECOLOGY

## 2022-06-16 NOTE — PROGRESS NOTES
Rossy Ramos is a 54 y.o. female who presents today for the following:  Chief Complaint   Patient presents with    Results        Allergies   Allergen Reactions    Sulfa (Sulfonamide Antibiotics) Swelling and Angioedema       Current Outpatient Medications   Medication Sig    amLODIPine (NORVASC) 5 mg tablet     valACYclovir (VALTREX) 500 mg tablet Take 1 Tablet by mouth daily. Indications: recurrent genital herpes    hydroCHLOROthiazide (HYDRODIURIL) 25 mg tablet TAKE 1 TABLET BY MOUTH EVERY DAY (Patient not taking: Reported on 5/18/2022)    valACYclovir (VALTREX) 500 mg tablet Take  by mouth two (2) times a day. No current facility-administered medications for this visit.        Past Medical History:   Diagnosis Date    Herpes     Hypertension     bp elevated patient to begin bp med after prpocedure today    Nipple discharge     left       Past Surgical History:   Procedure Laterality Date    HX BREAST BIOPSY Left     milk duct removed 4 yrs ago    HX BREAST LUMPECTOMY Left 11/16/2017    LEFT BREAST DUCTAL EXCISION performed [facility] Modesto Dias [facility]    HX BREAST REDUCTION  2001    HX GYN      BTL    HX HEMORRHOIDECTOMY  2014    HX ORTHOPAEDIC      HX TONSILLECTOMY      [place] LAP,TUBAL CAUTERY         Family History   Problem Relation Age of Onset    Heart Disease Mother     Hypertension Mother     Cancer Sister         cervical and stomach    Hypertension Sister     Hypertension Brother        Social History     Socioeconomic History    Marital status:      Spouse name: Not on file    Number of children: Not on file    Years of education: Not on file    Highest education level: Not on file   Occupational History    Not on file   Tobacco Use    Smoking status: Never Smoker    Smokeless tobacco: Never Used   Vaping Use    Vaping Use: Never used   Substance and Sexual Activity    Alcohol use: Yes     Comment: 1 drink per week    Drug use: No    Sexual activity: Yes     Partners: Male     Birth control/protection: Surgical     Comment: BTL   Other Topics Concern    Not on file   Social History Narrative    Not on file     Social Determinants of Health     Financial Resource Strain:     Difficulty of Paying Living Expenses: Not on file   Food Insecurity:     Worried About Running Out of Food in the Last Year: Not on file    [place] of Food in the Last Year: Not on file   Transportation Needs:     Lack of Transportation (Medical): Not on file    Lack of Transportation (Non-Medical): Not on file   Physical Activity:     Days of Exercise per Week: Not on file    Minutes of Exercise per Session: Not on file   Stress:     Feeling of Stress : Not on file   Social Connections:     Frequency of Communication with Friends and Family: Not on file    Frequency of Social Gatherings with Friends and Family: Not on file    Attends Confucianism Services: Not on file    Active Member of [address] [place] [address] [place] [place] or Organizations: Not on file    Attends Club or Organization Meetings: Not on file    Marital Status: Not on file   Intimate Partner Violence:     Fear of Current or Ex-Partner: Not on file    Emotionally Abused: Not on file    Physically Abused: Not on file    Sexually Abused: Not on file   Housing Stability:     Unable to Pay for Housing in the Last Year: Not on file    Number of [place] in the Last Year: Not on file    Unstable Housing in the Last Year: Not on file         HPI  54years old patient with history of irregular bleeding who presented today to discuss pelvic ultrasound findings of fibroid uterus. ROS   Review of Systems   Constitutional: Negative. HENT: Negative. Eyes: Negative. Respiratory: Negative. Cardiovascular: Negative. Gastrointestinal: Negative. Genitourinary: Positive for irregular bleeding  Musculoskeletal: Negative. Skin: Negative. Neurological: Negative. Endo/Heme/Allergies: Negative. Psychiatric/Behavioral: Negative. BP (!) 156/109 (BP 1 Location: Left upper arm, BP Patient Position: Sitting, BP Cuff Size: Adult)   Pulse 62   Temp 96.9 °F (36.1 °C) (Temporal)   Ht 5' 9\" (1.753 m)   Wt 251 lb 9 oz (114.1 kg)   SpO2 100%   BMI 37.15 kg/m²    OBGyn Exam   Constitutional  · Appearance: well-nourished, well developed, alert, in no acute distress    HENT  · Head and Face: appears normal    Neck  · Inspection/Palpation: normal appearance, no masses or tenderness  · Lymph Nodes: no lymphadenopathy present  · Thyroid: gland size normal, nontender, no nodules or masses present on palpation    Chest  · Respiratory Effort: Even and unlabored  · Auscultation: normal breath sounds    Cardiovascular  · Heart:  · Auscultation: regular rate and rhythm without murmur    Gastrointestinal  · Abdominal Examination: abdomen non-tender to palpation, normal bowel sounds, no masses present  · Liver and spleen: no hepatomegaly present, spleen not palpable  · Hernias: no hernias identified    Skin  · General Inspection: no rash, no lesions identified    Neurologic/Psychiatric  · Mental Status:  · Orientation: grossly oriented to person, place and time  · Mood and Affect: mood normal, affect appropriate    No results found for this visit on 06/16/22. No orders of the defined types were placed in this encounter. EXAM:  US TRANSVAGINAL     INDICATION:  Abnormal uterine bleeding     COMPARISON: August 2015.     TECHNIQUE:  Real-time sonography of the pelvis was performed transvaginally. Multiple static images of the uterus and ovaries were obtained.     FINDINGS:     Transvaginally, the uterus is enlarged and measures 11.0 x 6.4 x 6.1 cm. Multiple fibroids. Largest fibroid measures 4.6 x 4.7 x 3.4 cm. The endometrial  stripe measures 8 mm. The right ovary measures 2.8 x 1.3 x 1.3 cm and the left  ovary measures 3.1 x 2.6 x 2.6 cm.  There is no mass or fluid or other  abnormality in the adnexa or cul-de-sac.     IMPRESSION  Enlarged, fibroid uterus. 1. Uterine leiomyoma, unspecified location      2. Abnormal uterine bleeding (AUB)      Patient oriented about pelvic ultrasound findings. She was also oriented about the importance of endometrial biopsy. Different options discussed with patient today. The risks, benefits, complications, alternative treatment options, and expected outcomes were discussed with the patient. The possibilities of reaction to medication, pain, infection, bleeding, major cardiovascular event, death, damage to surrounding structures were specifically addressed. Patient was given an opportunity to ask questions, all questions were answered, patient voiced understanding of above. Informed consent was obtained. Patient will be scheduled for hysteroscopy/D&C. Follow-up and Dispositions    · Return in about 3 weeks (around 7/7/2022) for Postoperative follow-up. no sports/gym/no heavy lifting/no exercise

## 2022-08-11 ENCOUNTER — HOSPITAL ENCOUNTER (OUTPATIENT)
Dept: PREADMISSION TESTING | Age: 52
Discharge: HOME | End: 2022-08-11
Payer: COMMERCIAL

## 2022-08-11 VITALS
SYSTOLIC BLOOD PRESSURE: 146 MMHG | OXYGEN SATURATION: 97 % | HEART RATE: 60 BPM | BODY MASS INDEX: 37.5 KG/M2 | RESPIRATION RATE: 18 BRPM | TEMPERATURE: 98 F | HEIGHT: 69 IN | DIASTOLIC BLOOD PRESSURE: 95 MMHG | WEIGHT: 253.2 LBS

## 2022-08-11 LAB
ABO + RH BLD: NORMAL
BLOOD GROUP ANTIBODIES SERPL: NEGATIVE
ERYTHROCYTE [DISTWIDTH] IN BLOOD BY AUTOMATED COUNT: 12.9 % (ref 11.5–14.5)
HCT VFR BLD AUTO: 40.4 % (ref 35–47)
HGB BLD-MCNC: 13.6 G/DL (ref 11.5–16)
MCH RBC QN AUTO: 31.3 PG (ref 26–34)
MCHC RBC AUTO-ENTMCNC: 33.7 G/DL (ref 30–36.5)
MCV RBC AUTO: 92.9 FL (ref 80–99)
NRBC # BLD: 0 K/UL (ref 0–0.01)
NRBC BLD-RTO: 0 PER 100 WBC
PLATELET # BLD AUTO: 304 K/UL (ref 150–400)
PMV BLD AUTO: 9.4 FL (ref 8.9–12.9)
RBC # BLD AUTO: 4.35 M/UL (ref 3.8–5.2)
SPECIMEN EXP DATE BLD: NORMAL
WBC # BLD AUTO: 6.1 K/UL (ref 3.6–11)

## 2022-08-11 PROCEDURE — 83036 HEMOGLOBIN GLYCOSYLATED A1C: CPT

## 2022-08-11 PROCEDURE — 86900 BLOOD TYPING SEROLOGIC ABO: CPT

## 2022-08-11 PROCEDURE — 85027 COMPLETE CBC AUTOMATED: CPT

## 2022-08-11 PROCEDURE — 36415 COLL VENOUS BLD VENIPUNCTURE: CPT

## 2022-08-12 LAB
EST. AVERAGE GLUCOSE BLD GHB EST-MCNC: 105 MG/DL
HBA1C MFR BLD: 5.3 % (ref 4–5.6)

## 2022-08-17 ENCOUNTER — ANESTHESIA EVENT (OUTPATIENT)
Dept: SURGERY | Age: 52
End: 2022-08-17
Payer: COMMERCIAL

## 2022-08-17 ENCOUNTER — HOSPITAL ENCOUNTER (OUTPATIENT)
Age: 52
Discharge: HOME | End: 2022-08-17
Attending: OBSTETRICS & GYNECOLOGY | Admitting: OBSTETRICS & GYNECOLOGY
Payer: COMMERCIAL

## 2022-08-17 ENCOUNTER — ANESTHESIA (OUTPATIENT)
Dept: SURGERY | Age: 52
End: 2022-08-17
Payer: COMMERCIAL

## 2022-08-17 VITALS
HEIGHT: 69 IN | WEIGHT: 257 LBS | DIASTOLIC BLOOD PRESSURE: 72 MMHG | TEMPERATURE: 98.1 F | HEART RATE: 60 BPM | OXYGEN SATURATION: 100 % | RESPIRATION RATE: 20 BRPM | BODY MASS INDEX: 38.06 KG/M2 | SYSTOLIC BLOOD PRESSURE: 114 MMHG

## 2022-08-17 DIAGNOSIS — Z98.890 STATUS POST HYSTEROSCOPY: Primary | ICD-10-CM

## 2022-08-17 PROBLEM — N93.9 ABNORMAL UTERINE BLEEDING: Status: ACTIVE | Noted: 2022-08-17

## 2022-08-17 LAB — HCG UR QL: NEGATIVE

## 2022-08-17 PROCEDURE — 2709999900 HC NON-CHARGEABLE SUPPLY: Performed by: OBSTETRICS & GYNECOLOGY

## 2022-08-17 PROCEDURE — 74011000250 HC RX REV CODE- 250: Performed by: NURSE ANESTHETIST, CERTIFIED REGISTERED

## 2022-08-17 PROCEDURE — 76210000063 HC OR PH I REC FIRST 0.5 HR: Performed by: OBSTETRICS & GYNECOLOGY

## 2022-08-17 PROCEDURE — 81025 URINE PREGNANCY TEST: CPT

## 2022-08-17 PROCEDURE — 76060000032 HC ANESTHESIA 0.5 TO 1 HR: Performed by: OBSTETRICS & GYNECOLOGY

## 2022-08-17 PROCEDURE — 77030040361 HC SLV COMPR DVT MDII -B: Performed by: OBSTETRICS & GYNECOLOGY

## 2022-08-17 PROCEDURE — 74011250636 HC RX REV CODE- 250/636: Performed by: OBSTETRICS & GYNECOLOGY

## 2022-08-17 PROCEDURE — 88305 TISSUE EXAM BY PATHOLOGIST: CPT

## 2022-08-17 PROCEDURE — 77030020268 HC MISC GENERAL SUPPLY: Performed by: OBSTETRICS & GYNECOLOGY

## 2022-08-17 PROCEDURE — 77030019905 HC CATH URETH INTMIT MDII -A: Performed by: OBSTETRICS & GYNECOLOGY

## 2022-08-17 PROCEDURE — 36415 COLL VENOUS BLD VENIPUNCTURE: CPT

## 2022-08-17 PROCEDURE — 58558 HYSTEROSCOPY BIOPSY: CPT | Performed by: OBSTETRICS & GYNECOLOGY

## 2022-08-17 PROCEDURE — 76210000026 HC REC RM PH II 1 TO 1.5 HR: Performed by: OBSTETRICS & GYNECOLOGY

## 2022-08-17 PROCEDURE — 76010000138 HC OR TIME 0.5 TO 1 HR: Performed by: OBSTETRICS & GYNECOLOGY

## 2022-08-17 PROCEDURE — 74011250636 HC RX REV CODE- 250/636: Performed by: NURSE ANESTHETIST, CERTIFIED REGISTERED

## 2022-08-17 RX ORDER — HYDROCODONE BITARTRATE AND ACETAMINOPHEN 5; 325 MG/1; MG/1
1 TABLET ORAL AS NEEDED
Status: DISCONTINUED | OUTPATIENT
Start: 2022-08-17 | End: 2022-08-17 | Stop reason: HOSPADM

## 2022-08-17 RX ORDER — SODIUM CHLORIDE, SODIUM LACTATE, POTASSIUM CHLORIDE, CALCIUM CHLORIDE 600; 310; 30; 20 MG/100ML; MG/100ML; MG/100ML; MG/100ML
1000 INJECTION, SOLUTION INTRAVENOUS CONTINUOUS
Status: DISCONTINUED | OUTPATIENT
Start: 2022-08-17 | End: 2022-08-17 | Stop reason: HOSPADM

## 2022-08-17 RX ORDER — MIDAZOLAM HYDROCHLORIDE 1 MG/ML
1 INJECTION, SOLUTION INTRAMUSCULAR; INTRAVENOUS AS NEEDED
Status: DISCONTINUED | OUTPATIENT
Start: 2022-08-17 | End: 2022-08-17 | Stop reason: HOSPADM

## 2022-08-17 RX ORDER — SODIUM CHLORIDE 0.9 % (FLUSH) 0.9 %
5-40 SYRINGE (ML) INJECTION EVERY 8 HOURS
Status: DISCONTINUED | OUTPATIENT
Start: 2022-08-17 | End: 2022-08-17 | Stop reason: HOSPADM

## 2022-08-17 RX ORDER — MORPHINE SULFATE 10 MG/ML
2 INJECTION, SOLUTION INTRAMUSCULAR; INTRAVENOUS
Status: DISCONTINUED | OUTPATIENT
Start: 2022-08-17 | End: 2022-08-17 | Stop reason: HOSPADM

## 2022-08-17 RX ORDER — KETOROLAC TROMETHAMINE 30 MG/ML
INJECTION, SOLUTION INTRAMUSCULAR; INTRAVENOUS AS NEEDED
Status: DISCONTINUED | OUTPATIENT
Start: 2022-08-17 | End: 2022-08-17 | Stop reason: HOSPADM

## 2022-08-17 RX ORDER — MIDAZOLAM HYDROCHLORIDE 1 MG/ML
0.5 INJECTION, SOLUTION INTRAMUSCULAR; INTRAVENOUS
Status: DISCONTINUED | OUTPATIENT
Start: 2022-08-17 | End: 2022-08-17 | Stop reason: HOSPADM

## 2022-08-17 RX ORDER — OXYCODONE AND ACETAMINOPHEN 5; 325 MG/1; MG/1
1 TABLET ORAL
Qty: 12 TABLET | Refills: 0 | Status: SHIPPED | OUTPATIENT
Start: 2022-08-17 | End: 2022-08-20

## 2022-08-17 RX ORDER — HYDROMORPHONE HYDROCHLORIDE 1 MG/ML
0.5 INJECTION, SOLUTION INTRAMUSCULAR; INTRAVENOUS; SUBCUTANEOUS
Status: DISCONTINUED | OUTPATIENT
Start: 2022-08-17 | End: 2022-08-17 | Stop reason: HOSPADM

## 2022-08-17 RX ORDER — ONDANSETRON 2 MG/ML
4 INJECTION INTRAMUSCULAR; INTRAVENOUS AS NEEDED
Status: DISCONTINUED | OUTPATIENT
Start: 2022-08-17 | End: 2022-08-17 | Stop reason: HOSPADM

## 2022-08-17 RX ORDER — FENTANYL CITRATE 50 UG/ML
25 INJECTION, SOLUTION INTRAMUSCULAR; INTRAVENOUS
Status: DISCONTINUED | OUTPATIENT
Start: 2022-08-17 | End: 2022-08-17 | Stop reason: HOSPADM

## 2022-08-17 RX ORDER — NORETHINDRONE AND ETHINYL ESTRADIOL 0.5-0.035
5 KIT ORAL AS NEEDED
Status: DISCONTINUED | OUTPATIENT
Start: 2022-08-17 | End: 2022-08-17 | Stop reason: HOSPADM

## 2022-08-17 RX ORDER — SODIUM CHLORIDE, SODIUM LACTATE, POTASSIUM CHLORIDE, CALCIUM CHLORIDE 600; 310; 30; 20 MG/100ML; MG/100ML; MG/100ML; MG/100ML
INJECTION, SOLUTION INTRAVENOUS
Status: DISCONTINUED | OUTPATIENT
Start: 2022-08-17 | End: 2022-08-17 | Stop reason: HOSPADM

## 2022-08-17 RX ORDER — CEFAZOLIN SODIUM 1 G/3ML
INJECTION, POWDER, FOR SOLUTION INTRAMUSCULAR; INTRAVENOUS AS NEEDED
Status: DISCONTINUED | OUTPATIENT
Start: 2022-08-17 | End: 2022-08-17 | Stop reason: HOSPADM

## 2022-08-17 RX ORDER — DIPHENHYDRAMINE HYDROCHLORIDE 50 MG/ML
12.5 INJECTION, SOLUTION INTRAMUSCULAR; INTRAVENOUS AS NEEDED
Status: DISCONTINUED | OUTPATIENT
Start: 2022-08-17 | End: 2022-08-17 | Stop reason: HOSPADM

## 2022-08-17 RX ORDER — ONDANSETRON 2 MG/ML
INJECTION INTRAMUSCULAR; INTRAVENOUS AS NEEDED
Status: DISCONTINUED | OUTPATIENT
Start: 2022-08-17 | End: 2022-08-17 | Stop reason: HOSPADM

## 2022-08-17 RX ORDER — PROPOFOL 10 MG/ML
INJECTION, EMULSION INTRAVENOUS AS NEEDED
Status: DISCONTINUED | OUTPATIENT
Start: 2022-08-17 | End: 2022-08-17 | Stop reason: HOSPADM

## 2022-08-17 RX ORDER — SODIUM CHLORIDE 0.9 % (FLUSH) 0.9 %
5-40 SYRINGE (ML) INJECTION AS NEEDED
Status: DISCONTINUED | OUTPATIENT
Start: 2022-08-17 | End: 2022-08-17 | Stop reason: HOSPADM

## 2022-08-17 RX ORDER — LIDOCAINE HYDROCHLORIDE 10 MG/ML
0.1 INJECTION, SOLUTION EPIDURAL; INFILTRATION; INTRACAUDAL; PERINEURAL AS NEEDED
Status: DISCONTINUED | OUTPATIENT
Start: 2022-08-17 | End: 2022-08-17 | Stop reason: HOSPADM

## 2022-08-17 RX ORDER — GLYCOPYRROLATE 0.2 MG/ML
INJECTION INTRAMUSCULAR; INTRAVENOUS AS NEEDED
Status: DISCONTINUED | OUTPATIENT
Start: 2022-08-17 | End: 2022-08-17 | Stop reason: HOSPADM

## 2022-08-17 RX ORDER — LIDOCAINE HYDROCHLORIDE 20 MG/ML
INJECTION, SOLUTION EPIDURAL; INFILTRATION; INTRACAUDAL; PERINEURAL AS NEEDED
Status: DISCONTINUED | OUTPATIENT
Start: 2022-08-17 | End: 2022-08-17 | Stop reason: HOSPADM

## 2022-08-17 RX ORDER — IBUPROFEN 800 MG/1
800 TABLET ORAL
Qty: 30 TABLET | Refills: 0 | Status: SHIPPED | OUTPATIENT
Start: 2022-08-17 | End: 2022-08-27

## 2022-08-17 RX ORDER — FENTANYL CITRATE 50 UG/ML
INJECTION, SOLUTION INTRAMUSCULAR; INTRAVENOUS AS NEEDED
Status: DISCONTINUED | OUTPATIENT
Start: 2022-08-17 | End: 2022-08-17 | Stop reason: HOSPADM

## 2022-08-17 RX ORDER — DEXAMETHASONE SODIUM PHOSPHATE 4 MG/ML
INJECTION, SOLUTION INTRA-ARTICULAR; INTRALESIONAL; INTRAMUSCULAR; INTRAVENOUS; SOFT TISSUE AS NEEDED
Status: DISCONTINUED | OUTPATIENT
Start: 2022-08-17 | End: 2022-08-17 | Stop reason: HOSPADM

## 2022-08-17 RX ORDER — FENTANYL CITRATE 50 UG/ML
50 INJECTION, SOLUTION INTRAMUSCULAR; INTRAVENOUS AS NEEDED
Status: DISCONTINUED | OUTPATIENT
Start: 2022-08-17 | End: 2022-08-17 | Stop reason: HOSPADM

## 2022-08-17 RX ADMIN — GLYCOPYRROLATE 0.1 MG: 0.2 INJECTION INTRAMUSCULAR; INTRAVENOUS at 08:30

## 2022-08-17 RX ADMIN — CEFAZOLIN SODIUM 2 G: 1 INJECTION, POWDER, FOR SOLUTION INTRAMUSCULAR; INTRAVENOUS at 08:36

## 2022-08-17 RX ADMIN — PHENYLEPHRINE HYDROCHLORIDE 200 MCG: 10 INJECTION INTRAVENOUS at 09:02

## 2022-08-17 RX ADMIN — FENTANYL CITRATE 100 MCG: 50 INJECTION, SOLUTION INTRAMUSCULAR; INTRAVENOUS at 08:30

## 2022-08-17 RX ADMIN — ONDANSETRON 4 MG: 2 INJECTION INTRAMUSCULAR; INTRAVENOUS at 08:30

## 2022-08-17 RX ADMIN — PHENYLEPHRINE HYDROCHLORIDE 100 MCG: 10 INJECTION INTRAVENOUS at 08:53

## 2022-08-17 RX ADMIN — SODIUM CHLORIDE, POTASSIUM CHLORIDE, SODIUM LACTATE AND CALCIUM CHLORIDE: 600; 310; 30; 20 INJECTION, SOLUTION INTRAVENOUS at 08:25

## 2022-08-17 RX ADMIN — PROPOFOL 200 MG: 10 INJECTION, EMULSION INTRAVENOUS at 08:30

## 2022-08-17 RX ADMIN — DEXAMETHASONE SODIUM PHOSPHATE 4 MG: 4 INJECTION, SOLUTION INTRA-ARTICULAR; INTRALESIONAL; INTRAMUSCULAR; INTRAVENOUS; SOFT TISSUE at 08:30

## 2022-08-17 RX ADMIN — KETOROLAC TROMETHAMINE 30 MG: 30 INJECTION, SOLUTION INTRAMUSCULAR at 09:07

## 2022-08-17 RX ADMIN — LIDOCAINE HYDROCHLORIDE 80 MG: 20 INJECTION, SOLUTION EPIDURAL; INFILTRATION; INTRACAUDAL; PERINEURAL at 08:30

## 2022-08-17 RX ADMIN — SODIUM CHLORIDE, POTASSIUM CHLORIDE, SODIUM LACTATE AND CALCIUM CHLORIDE 1000 ML: 600; 310; 30; 20 INJECTION, SOLUTION INTRAVENOUS at 07:01

## 2022-08-17 NOTE — PROCEDURES
OPERATIVE NOTE: HYSTEROSCOPY/D&C/EXAM UNDER ANESTHESIA    PREOPERATIVE DIAGNOSIS:  Abnormal uterine bleeding  Fibroid uterus    POSTOPERATIVE DIAGNOSIS:  Same    PROCEDURE:  Hysteroscopy  Uterine cervix dilation and endometrial curettage  Examination under anesthesia    ANESTHESIA: General    ANESTHESIOLOGIST: Dr. Kyra Taylor: REBECA Mishra [facility]: JOSE Kurtz    COMPLICATIONS: None    CONDITION DURING SURGERY: Stable    ESTIMATED BLOOD LOSS: 15 mL    SURGICAL COUNT: Correct x 3    SPECIMEN: Endometrium    FINDINGS: Pelvic exam under anesthesia findings: Enlarged, irregular uterus, approximately 11 cm, no adnexal masses palpated. Hysteroscopic findings: Normal endometrial cavity, somewhat thickened endometrium, bilateral tubal ostia visualized. DESCRIPTION OF PROCEDURE: The patient was taken to the operating room. General esthesia was found to be adequate. She was then prepped and draped in normal sterile fashion in dorsal lithotomy position. Under sterile conditions, the patient was straight catheterized and 100 mL of clear urine was obtained. Examination under anesthesia was performed. A weighted speculum and Hernandez retractor were used to visualize the cervix. The anterior lip of the cervix was then grasped with a single-tooth tenaculum. The uterus was then sounded to 11 cm. Next, the cervix was dilated in order to accommodate a 5 mm, 30 degree scope. Hysteroscope was then inserted under direct visualization, revealing above findings. Bilateral ostia were visualized. Endometrial sampling obtained with [employer] system. Hysteroscope was then removed under direct visualization and endometrial curettage was performed on all quadrants with sharp uterine curette. All instruments were removed from the patient's vagina. The tenaculum site was noted to be hemostatic. The patient tolerated this procedure well. Patient will be transferred to PACU.

## 2022-08-17 NOTE — ANESTHESIA POSTPROCEDURE EVALUATION
Procedure(s): HYSTEROSCOPY DILATION AND CURETTAGE. general    Anesthesia Post Evaluation      Multimodal analgesia: multimodal analgesia used between 6 hours prior to anesthesia start to PACU discharge  Patient location during evaluation: PACU  Patient participation: complete - patient participated  Level of consciousness: awake  Pain score: 0  Pain management: adequate  Airway patency: patent  Anesthetic complications: no  Cardiovascular status: acceptable  Respiratory status: acceptable  Hydration status: acceptable  Post anesthesia nausea and vomiting:  controlled  Final Post Anesthesia Temperature Assessment:  Normothermia (36.0-37.5 degrees C)      INITIAL Post-op Vital signs:   Vitals Value Taken Time   /89 08/17/22 0930   Temp 36.8 °C (98.3 °F) 08/17/22 0918   Pulse 58 08/17/22 0930   Resp 16 08/17/22 0930   SpO2 100 % 08/17/22 0930     Dr. Coco Ramsay M.D.   Anesthesiology, Critical Care, Pain Management  8/17/2022

## 2022-08-17 NOTE — PROGRESS NOTES
PT ASSISTED TO BR , VOIDED , SMALL AMT VAGINAL BLEEDING NOTED , IV DC'D SITE INTACT NO SWELLING NOTED DISCHARGE INSTRUCTIONS GIVEN TO PT AND PT'S  BRITTANY MORENO , BOTH VERBALIZED UNDERSTANDING , NO DISTRESS NOTED

## 2022-08-17 NOTE — H&P
Rusty Alpers is a 54 y.o. female who presents today for the following:  No chief complaint on file.        Allergies   Allergen Reactions    Sulfa (Sulfonamide Antibiotics) Swelling and Angioedema       Current Facility-Administered Medications   Medication    lactated Ringers infusion 1,000 mL    sodium chloride (NS) flush 5-40 mL    sodium chloride (NS) flush 5-40 mL    lidocaine (PF) (XYLOCAINE) 10 mg/mL (1 %) injection 0.1 mL    fentaNYL citrate (PF) injection 50 mcg    midazolam (VERSED) injection 1 mg       Past Medical History:   Diagnosis Date    Fibroids     uterine    Herpes     Hyperlipidemia     Hypertension     bp elevated patient to begin bp med after prpocedure today    Nipple discharge     left       Past Surgical History:   Procedure Laterality Date    HX BREAST BIOPSY Left     milk duct removed 4 yrs ago    HX BREAST LUMPECTOMY Left 11/16/2017    LEFT BREAST DUCTAL EXCISION performed by Anh Allan MD at [place] [address]    HX BREAST REDUCTION  2001    HX GYN      BTL    HX HEMORRHOIDECTOMY  2014    HX ORTHOPAEDIC      Left shoulder    HX TONSILLECTOMY      MO LAP,TUBAL CAUTERY         Family History   Problem Relation Age of Onset    Heart Disease Mother     Hypertension Mother     No Known Problems Father     Cancer Sister         cervical and stomach    Hypertension Sister     Hypertension Brother        Social History     Socioeconomic History    Marital status:      Spouse name: Not on file    Number of children: Not on file    Years of education: Not on file    Highest education level: Not on file   Occupational History    Not on file   Tobacco Use    Smoking status: Never    Smokeless tobacco: Never   Vaping Use    Vaping Use: Never used   Substance and Sexual Activity    Alcohol use: Yes     Comment: 1 drink per week    Drug use: Yes     Types: Marijuana     Comment: Patient stated eats a gummy on occasion    Sexual activity: Yes     Partners: Male     Birth control/protection: Surgical     Comment: BTL   Other Topics Concern    Not on file   Social History Narrative    Not on file     Social Determinants of Health     Financial Resource Strain: Not on file   Food Insecurity: Not on file   Transportation Needs: Not on file   Physical Activity: Not on file   Stress: Not on file   Social Connections: Not on file   Intimate Partner Violence: Not on file   Housing Stability: Not on file         HPI  54years old patient with history of fibroid uterus and abnormal uterine bleeding who is admitted today for hysteroscopy with D&C. She has no complaints today. ROS   Review of Systems   Constitutional: Negative. HENT: Negative. Eyes: Negative. Respiratory: Negative. Cardiovascular: Negative. Gastrointestinal: Negative. Genitourinary: Positive for irregular bleeding and fibroid uterus  Musculoskeletal: Negative. Skin: Negative. Neurological: Negative. Endo/Heme/Allergies: Negative. Psychiatric/Behavioral: Negative. BP (!) 152/86 (BP 1 Location: Left upper arm, BP Patient Position: At rest)   Pulse 64   Temp 97.8 °F (36.6 °C)   Resp 18   Ht 5' 9\" (1.753 m)   Wt 116.6 kg (257 lb)   LMP 08/01/2022 (Within Days)   SpO2 98%   BMI 37.95 kg/m²    OBGyn Exam   Constitutional  Appearance: well-nourished, well developed, alert, in no acute distress    HENT  Head and Face: appears normal    Neck  Inspection/Palpation: normal appearance, no masses or tenderness  Lymph Nodes: no lymphadenopathy present  Thyroid: gland size normal, nontender, no nodules or masses present on palpation    Chest  Respiratory Effort: Even and unlabored  Auscultation: normal breath sounds    Cardiovascular  Heart:   Auscultation: regular rate and rhythm without murmur    Gastrointestinal  Abdominal Examination: abdomen non-tender to palpation, normal bowel sounds, no masses present  Liver and spleen: no hepatomegaly present, spleen not palpable  Hernias: no hernias identified    Skin  General Inspection: no rash, no lesions identified    Neurologic/Psychiatric  Mental Status:  Orientation: grossly oriented to person, place and time  Mood and Affect: mood normal, affect appropriate    Results for orders placed or performed during the hospital encounter of 08/17/22   POC HCG,URINE   Result Value Ref Range    Pregnancy test,urine (POC) Negative Negative          Orders Placed This Encounter    POC URINE DIPSTICK     Standing Status:   Standing     Number of Occurrences:   1    TRANSCRIBE CONSENT ONE TIME     Standing Status:   Standing     Number of Occurrences:   1     Order Specific Question:   Transcribe the name of procedure IN TECHNICAL TERMS (specify Left, Right, Bilateral and digits or level when indicated - no abbreviations please)     Answer:   hysteroscopy, dilation and curettage     Order Specific Question:   Transcribe the name of procedure IN LAY TERMS     Answer:   D&C    POC GLUCOSE     Standing Status:   Standing     Number of Occurrences:   1    POC HCG,URINE     Standing Status:   Standing     Number of Occurrences:   1    lactated Ringers infusion 1,000 mL    sodium chloride (NS) flush 5-40 mL    sodium chloride (NS) flush 5-40 mL    lidocaine (PF) (XYLOCAINE) 10 mg/mL (1 %) injection 0.1 mL    fentaNYL citrate (PF) injection 50 mcg    midazolam (VERSED) injection 1 mg    INITIAL PHYSICIAN ORDER: OBSERVATION/OUTPATIENT IN A BED OUTPATIENT; Surgical     Standing Status:   Standing     Number of Occurrences:   1     Order Specific Question:   Patient Class:      Answer:   OUTPATIENT [102]     Order Specific Question:   Type of Bed     Answer:   Surgical [18]     Order Specific Question:   Admitting Diagnosis     Answer:   Abnormal uterine bleeding [[id number]]     Order Specific Question:   Admitting Physician     Answer:   Nicole Brooks [[id number]]     Order Specific Question:   Attending Physician     Answer:   Breana Vaughn, [address] [place] [address] TRANSVAGINAL     INDICATION:  Abnormal uterine bleeding     COMPARISON: August 2015. TECHNIQUE:  Real-time sonography of the pelvis was performed transvaginally. Multiple static images of the uterus and ovaries were obtained. FINDINGS:     Transvaginally, the uterus is enlarged and measures 11.0 x 6.4 x 6.1 cm. Multiple fibroids. Largest fibroid measures 4.6 x 4.7 x 3.4 cm. The endometrial  stripe measures 8 mm. The right ovary measures 2.8 x 1.3 x 1.3 cm and the left  ovary measures 3.1 x 2.6 x 2.6 cm. There is no mass or fluid or other  abnormality in the adnexa or cul-de-sac. IMPRESSION  Enlarged, fibroid uterus. ASSESSMENT:    1. Uterine leiomyoma, unspecified location        2. Abnormal uterine bleeding (AUB)        Patient oriented about pelvic ultrasound findings. She was also oriented about the importance of endometrial biopsy. Different options discussed with patient today. The risks, benefits, complications, alternative treatment options, and expected outcomes were discussed with the patient. The possibilities of reaction to medication, pain, infection, bleeding, major cardiovascular event, death, damage to surrounding structures were specifically addressed. Patient was given an opportunity to ask questions, all questions were answered, patient voiced understanding of above. Informed consent was obtained. Patient is scheduled for hysteroscopy/D&C.

## 2022-08-17 NOTE — ANESTHESIA PREPROCEDURE EVALUATION
Relevant Problems   CARDIOVASCULAR   (+) Essential hypertension       Anesthetic History   No history of anesthetic complications            Review of Systems / Medical History  Patient summary reviewed, nursing notes reviewed and pertinent labs reviewed    Pulmonary  Within defined limits                 Neuro/Psych   Within defined limits           Cardiovascular    Hypertension              Exercise tolerance: >4 METS     GI/Hepatic/Renal  Within defined limits              Endo/Other  Within defined limits           Other Findings   Comments: Medical History    Nipple discharge  Hypertension  Herpes  Hyperlipidemia  Fibroids           Physical Exam    Airway  Mallampati: II  TM Distance: 4 - 6 cm  Neck ROM: normal range of motion        Cardiovascular    Rhythm: regular  Rate: normal         Dental  No notable dental hx       Pulmonary  Breath sounds clear to auscultation               Abdominal         Other Findings            Anesthetic Plan    ASA: 3  Anesthesia type: general            Anesthetic plan and risks discussed with: Patient            Dr. Bri Gold M.D.   Anesthesiology, Critical Care, Pain Management  8/17/2022

## 2022-08-23 ENCOUNTER — TELEPHONE (OUTPATIENT)
Dept: OBGYN CLINIC | Age: 52
End: 2022-08-23

## 2022-08-23 DIAGNOSIS — B37.9 YEAST INFECTION: Primary | ICD-10-CM

## 2022-08-23 RX ORDER — FLUCONAZOLE 150 MG/1
150 TABLET ORAL DAILY
Qty: 1 TABLET | Refills: 0 | Status: SHIPPED | OUTPATIENT
Start: 2022-08-23 | End: 2022-08-24

## 2022-08-23 NOTE — TELEPHONE ENCOUNTER
Patient called stating that she would like Diflucan for vaginal itching, denies d/c and odor. She just had a procedure last week and doesn't want to insert anything into the vagina, so that's why she would like a pill if possible.

## 2022-08-31 ENCOUNTER — OFFICE VISIT (OUTPATIENT)
Dept: OBGYN CLINIC | Age: 52
End: 2022-08-31
Payer: COMMERCIAL

## 2022-08-31 VITALS
OXYGEN SATURATION: 97 % | HEART RATE: 61 BPM | SYSTOLIC BLOOD PRESSURE: 150 MMHG | HEIGHT: 69 IN | DIASTOLIC BLOOD PRESSURE: 90 MMHG | WEIGHT: 251.13 LBS | BODY MASS INDEX: 37.2 KG/M2 | TEMPERATURE: 97.8 F

## 2022-08-31 DIAGNOSIS — Z98.890 STATUS POST HYSTEROSCOPY: ICD-10-CM

## 2022-08-31 DIAGNOSIS — Z09 POSTOP CHECK: Primary | ICD-10-CM

## 2022-08-31 DIAGNOSIS — D25.1 INTRAMURAL LEIOMYOMA OF UTERUS: ICD-10-CM

## 2022-08-31 PROCEDURE — 99024 POSTOP FOLLOW-UP VISIT: CPT | Performed by: OBSTETRICS & GYNECOLOGY

## 2022-08-31 NOTE — PROGRESS NOTES
Maximiliano Saldana is a 54 y.o. female who presents today for the following:  Chief Complaint   Patient presents with    Post OP Follow Up        Allergies   Allergen Reactions    Sulfa (Sulfonamide Antibiotics) Swelling and Angioedema       Current Outpatient Medications   Medication Sig    amLODIPine (NORVASC) 5 mg tablet Take 5 mg by mouth in the morning. No current facility-administered medications for this visit.        Past Medical History:   Diagnosis Date    Fibroids     uterine    Herpes     Hyperlipidemia     Hypertension     bp elevated patient to begin bp med after prpocedure today    Nipple discharge     left       Past Surgical History:   Procedure Laterality Date    HX BREAST BIOPSY Left     milk duct removed 4 yrs ago    HX BREAST LUMPECTOMY Left 11/16/2017    LEFT BREAST DUCTAL EXCISION performed by Bull Brown MD at [facility]    HX BREAST REDUCTION  2001    HX GYN      BTL    HX HEMORRHOIDECTOMY  2014    HX ORTHOPAEDIC      Left shoulder    HX TONSILLECTOMY      NE LAP,TUBAL CAUTERY         Family History   Problem Relation Age of Onset    Heart Disease Mother     Hypertension Mother     No Known Problems Father     Cancer Sister         cervical and stomach    Hypertension Sister     Hypertension Brother        Social History     Socioeconomic History    Marital status:      Spouse name: Not on file    Number of children: Not on file    Years of education: Not on file    Highest education level: Not on file   Occupational History    Not on file   Tobacco Use    Smoking status: Never    Smokeless tobacco: Never   Vaping Use    Vaping Use: Never used   Substance and Sexual Activity    Alcohol use: Yes     Comment: 1 drink per week    Drug use: Yes     Types: Marijuana     Comment: Patient stated eats a gummy on occasion    Sexual activity: Yes     Partners: Male     Birth control/protection: Surgical     Comment: BTL   Other Topics Concern    Not on file   Social History Narrative    Not on file     Social Determinants of Health     Financial Resource Strain: Not on file   Food Insecurity: Not on file   Transportation Needs: Not on file   Physical Activity: Not on file   Stress: Not on file   Social Connections: Not on file   Intimate Partner Violence: Not on file   Housing Stability: Not on file         HPI  54years old patient presented today for postoperative follow-up. She has history of fibroid uterus/abnormal uterine bleeding. She underwent a hysteroscopy with D&C with normal findings. ROS   Review of Systems   Constitutional: Negative. HENT: Negative. Eyes: Negative. Respiratory: Negative. Cardiovascular: Negative. Gastrointestinal: Negative. Genitourinary: Negative. Musculoskeletal: Negative. Skin: Negative. Neurological: Negative. Endo/Heme/Allergies: Negative. Psychiatric/Behavioral: Negative. BP (!) 150/90 (BP 1 Location: Left upper arm, BP Patient Position: Sitting, BP Cuff Size: Adult)   Pulse 61   Temp 97.8 °F (36.6 °C) (Temporal)   Ht 5' 9\" (1.753 m)   Wt 251 lb 2 oz (113.9 kg)   LMP 08/29/2022 (Exact Date)   SpO2 97%   BMI 37.08 kg/m²    OBGyn Exam   Constitutional  Appearance: well-nourished, well developed, alert, in no acute distress    Skin  General Inspection: no rash, no lesions identified    Neurologic/Psychiatric  Mental Status:  Orientation: grossly oriented to person, place and time  Mood and Affect: mood normal, affect appropriate    No results found for this visit on 08/31/22. No orders of the defined types were placed in this encounter. 1. Postop check      2. Status post hysteroscopy  Benign pathological finding discussed with patient today. 3. Intramural leiomyoma of uterus  There is no evidence of submucosal fibroids or polyps. Follow-up and Dispositions    Return if symptoms worsen or fail to improve.

## 2024-12-02 ENCOUNTER — OFFICE VISIT (OUTPATIENT)
Age: 54
End: 2024-12-02

## 2024-12-02 VITALS
WEIGHT: 243 LBS | SYSTOLIC BLOOD PRESSURE: 138 MMHG | BODY MASS INDEX: 35.88 KG/M2 | OXYGEN SATURATION: 97 % | HEART RATE: 55 BPM | TEMPERATURE: 98.1 F | RESPIRATION RATE: 18 BRPM | DIASTOLIC BLOOD PRESSURE: 92 MMHG

## 2024-12-02 DIAGNOSIS — R35.0 URINARY FREQUENCY: ICD-10-CM

## 2024-12-02 DIAGNOSIS — N30.00 ACUTE CYSTITIS WITHOUT HEMATURIA: Primary | ICD-10-CM

## 2024-12-02 LAB
BILIRUBIN, URINE, POC: NEGATIVE
BLOOD URINE, POC: ABNORMAL
GLUCOSE URINE, POC: NEGATIVE
KETONES, URINE, POC: NEGATIVE
LEUKOCYTE ESTERASE, URINE, POC: ABNORMAL
NITRITE, URINE, POC: NEGATIVE
PH, URINE, POC: 5.5 (ref 4.6–8)
PROTEIN,URINE, POC: NEGATIVE
SPECIFIC GRAVITY, URINE, POC: 1.02 (ref 1–1.03)
URINALYSIS CLARITY, POC: CLEAR
URINALYSIS COLOR, POC: YELLOW
UROBILINOGEN, POC: ABNORMAL MG/DL

## 2024-12-02 RX ORDER — NITROFURANTOIN 25; 75 MG/1; MG/1
100 CAPSULE ORAL 2 TIMES DAILY
Qty: 10 CAPSULE | Refills: 0 | Status: SHIPPED | OUTPATIENT
Start: 2024-12-02 | End: 2024-12-07

## 2024-12-02 NOTE — PATIENT INSTRUCTIONS
Exam and history consistent with UTI.  -Urinalysis in clinic shows evidence of UTI  -Macrobid twice a day for 5 days  -Drink plenty of fluids to maintain hydration  -Recommend follow-up with primary care physician in the next 1 to 2 weeks    If symptoms persist or worsen call please contact PCP and/or return to Urgent Care.

## 2024-12-02 NOTE — PROGRESS NOTES
2024   Margarette Sampson (: 1970) is a 54 y.o. female, New patient, here for evaluation of the following chief complaint(s):  Urinary Frequency (C/o frequent urination. Sx )     ASSESSMENT/PLAN:  Below is the assessment and plan developed based on review of pertinent history, physical exam, labs, studies, and medications.  Assessment & Plan  Acute cystitis without hematuria       Orders:    nitrofurantoin, macrocrystal-monohydrate, (MACROBID) 100 MG capsule; Take 1 capsule by mouth 2 times daily for 5 days  Exam and history consistent with UTI.  -Urinalysis in clinic shows evidence of UTI  -Macrobid twice a day for 5 days  -Drink plenty of fluids to maintain hydration    If symptoms persist or worsen call please contact PCP and/or return to Urgent Care.  Urinary frequency       Orders:    AMB POC URINALYSIS DIP STICK MANUAL W/O MICRO          Handout given with care instructions  2. OTC for symptom management. Increase fluid intake, ensure adequate nutritional intake.  3. Follow up with PCP as needed.  4. Go to ED with development of any acute symptoms.     Follow up:  No follow-ups on file.  Follow up immediately for any new, worsening or changes or if symptoms are not improving over the next 5-7 days.     SUBJECTIVE/OBJECTIVE:  HPI   Ms Sampson presents with concern for urinary frequency that has worsened over the past few days.  She denies any burning with urination, and is concerned about whether she has diabetes.  Otherwise, she denies any current headache, fever, chills, shortness of breath, chest pain, or other symptoms or concerns at this time.    Urinary Frequency (C/o frequent urination. Sx )      No results found for any visits on 24.    No results found for this visit on 24.    Review of Systems    ROS reviewed and negative except as stated in the HPI   Physical Exam   There were no vitals filed for this visit.     Allergies   Allergen Reactions    Sulfa Antibiotics Angioedema

## (undated) DEVICE — SUTURE VCRL SZ 3-0 L27IN ABSRB UD L26MM SH 1/2 CIR J416H

## (undated) DEVICE — MINOR VAGINAL PACK: Brand: MEDLINE INDUSTRIES, INC.

## (undated) DEVICE — CATHETER,URETHRAL,REDRUBBER,STRL,14FR: Brand: MEDLINE INDUSTRIES, INC.

## (undated) DEVICE — BASIC SINGLE BASIN-LF: Brand: MEDLINE INDUSTRIES, INC.

## (undated) DEVICE — SYSTEM WST MGMT AVETA

## (undated) DEVICE — DRAPE,LAPAROTOMY,T,PEDI,STERILE: Brand: MEDLINE

## (undated) DEVICE — SUTURE MCRYL SZ 4-0 L27IN ABSRB UD L19MM PS-2 1/2 CIR PRIM Y426H

## (undated) DEVICE — (D)PREP SKN CHLRAPRP APPL 26ML -- CONVERT TO ITEM 371833

## (undated) DEVICE — Device

## (undated) DEVICE — (D)SYR 10ML 1/5ML GRAD NSAF -- PKGING CHANGE USE ITEM 338027

## (undated) DEVICE — REM POLYHESIVE ADULT PATIENT RETURN ELECTRODE: Brand: VALLEYLAB

## (undated) DEVICE — SUT SLK 2-0SH 30IN BLK --

## (undated) DEVICE — SYSTEM WST MGMT W/ CAP AVETA

## (undated) DEVICE — KENDALL SCD EXPRESS SLEEVES, KNEE LENGTH, MEDIUM: Brand: KENDALL SCD

## (undated) DEVICE — STERILE POLYISOPRENE POWDER-FREE SURGICAL GLOVES: Brand: PROTEXIS

## (undated) DEVICE — DERMABOND SKIN ADH 0.7ML -- DERMABOND ADVANCED 12/BX

## (undated) DEVICE — DRAPE,UNDERBUTTOCKS,PCH,STERILE: Brand: MEDLINE

## (undated) DEVICE — GARMENT,MEDLINE,DVT,INT,CALF,MED, GEN2: Brand: MEDLINE

## (undated) DEVICE — TOWEL SURG W17XL27IN STD BLU COT NONFENESTRATED PREWASHED

## (undated) DEVICE — NEEDLE HYPO 25GA L1.5IN BVL ORIENTED ECLIPSE

## (undated) DEVICE — SOUTHSIDE TURNOVER: Brand: MEDLINE INDUSTRIES, INC.

## (undated) DEVICE — SMOKE EVACUATION PENCIL: Brand: VALLEYLAB

## (undated) DEVICE — SOLUTION IRRIG 3000ML 0.9% SOD CHL USP UROMATIC PLAS CONT

## (undated) DEVICE — SOLUTION IV 1000ML 0.9% SOD CHL

## (undated) DEVICE — GLOVE ORANGE PI 7 1/2   MSG9075

## (undated) DEVICE — SOLUTION IRRIG 500ML 0.9% SOD CHL USP POUR PLAS BTL

## (undated) DEVICE — DRAPE,REIN 53X77,STERILE: Brand: MEDLINE

## (undated) DEVICE — INFECTION CONTROL KIT SYS